# Patient Record
Sex: MALE | Race: WHITE | NOT HISPANIC OR LATINO | Employment: OTHER | URBAN - METROPOLITAN AREA
[De-identification: names, ages, dates, MRNs, and addresses within clinical notes are randomized per-mention and may not be internally consistent; named-entity substitution may affect disease eponyms.]

---

## 2017-02-22 ENCOUNTER — RADIANT APPOINTMENT (OUTPATIENT)
Dept: GENERAL RADIOLOGY | Facility: CLINIC | Age: 37
End: 2017-02-22
Attending: ORTHOPAEDIC SURGERY
Payer: COMMERCIAL

## 2017-02-22 ENCOUNTER — OFFICE VISIT (OUTPATIENT)
Dept: ORTHOPEDICS | Facility: CLINIC | Age: 37
End: 2017-02-22
Payer: COMMERCIAL

## 2017-02-22 VITALS
DIASTOLIC BLOOD PRESSURE: 72 MMHG | BODY MASS INDEX: 26.6 KG/M2 | WEIGHT: 190 LBS | RESPIRATION RATE: 16 BRPM | HEART RATE: 66 BPM | SYSTOLIC BLOOD PRESSURE: 139 MMHG | HEIGHT: 71 IN

## 2017-02-22 DIAGNOSIS — M25.621 STIFFNESS OF RIGHT ELBOW JOINT: ICD-10-CM

## 2017-02-22 DIAGNOSIS — S54.01XA: ICD-10-CM

## 2017-02-22 DIAGNOSIS — M25.621 STIFFNESS OF RIGHT ELBOW JOINT: Primary | ICD-10-CM

## 2017-02-22 PROCEDURE — 99203 OFFICE O/P NEW LOW 30 MIN: CPT | Performed by: ORTHOPAEDIC SURGERY

## 2017-02-22 PROCEDURE — 73070 X-RAY EXAM OF ELBOW: CPT | Mod: RT

## 2017-02-22 RX ORDER — TRAMADOL HYDROCHLORIDE 50 MG/1
50 TABLET ORAL EVERY 8 HOURS PRN
Qty: 30 TABLET | Refills: 0 | Status: SHIPPED | OUTPATIENT
Start: 2017-02-22 | End: 2018-10-24

## 2017-02-22 ASSESSMENT — PAIN SCALES - GENERAL: PAINLEVEL: MODERATE PAIN (4)

## 2017-02-22 NOTE — MR AVS SNAPSHOT
"              After Visit Summary   2/22/2017    John Valiente    MRN: [id number]           Patient Information     Date Of Birth          1980        Visit Information        Provider Department      2/22/2017 3:15 PM Marty Fernandes MD [facility]        Today's Diagnoses     Stiffness of right elbow joint    -  1    Contusion of ulnar nerve, right, initial encounter           Follow-ups after your visit        Your next 10 appointments already scheduled     Feb 27, 2017  8:00 AM CST   (Arrive by 7:45 AM)   Return Visit with Alexander Vaughan MD   [facility] ([facility])    [address] [place] [address]  4th Floor  [facility] [place] [address] [phone]              Who to contact     If you have questions or need follow up information about today's clinic visit or your schedule please contact [facility] directly at [phone].  Normal or non-critical lab and imaging results will be communicated to you by MyChart, letter or phone within 4 business days after the clinic has received the results. If you do not hear from us within 7 days, please contact the clinic through [facility] or phone. If you have a critical or abnormal lab result, we will notify you by phone as soon as possible.  Submit refill requests through [employer] or call your pharmacy and they will forward the refill request to us. Please allow 3 business days for your refill to be completed.          Additional Information About Your Visit        [facility] Information     [employer] lets you send messages to your doctor, view your test results, renew your prescriptions, schedule appointments and more. To sign up, go to [email or web address] . Click on \"Log in\" on the left side of the screen, which will take you to the Welcome page. Then click on \"Sign up Now\" on the right side of the page.     You will be asked to enter the access code listed below, as well as some personal " "information. Please follow the directions to create your username and password.     Your access code is: [email or web address]  Expires: 5/15/2017  6:31 AM     Your access code will  in 90 days. If you need help or a new code, please call your [facility] clinic or [phone].        Care EveryWhere ID     This is your Care EveryWhere ID. This could be used by other organizations to access your [facility] medical records  [id number]        Your Vitals Were     Pulse Respirations Height BMI (Body Mass Index)          66 16 1.803 m (5' 11\") 26.5 kg/m2         Blood Pressure from Last 3 Encounters:   17 139/72   10/02/15 121/63   09/29/15 125/72    Weight from Last 3 Encounters:   17 86.2 kg (190 lb)   10/02/15 82.6 kg (182 lb)   09/29/15 82.6 kg (182 lb 3.2 oz)                 Today's Medication Changes          These changes are accurate as of: 17 11:59 PM.  If you have any questions, ask your nurse or doctor.               Start taking these medicines.        Dose/Directions    traMADol 50 MG tablet   Commonly known as:  ULTRAM   Used for:  Contusion of ulnar nerve, right, initial encounter   Started by:  Marty Fernandes MD        Dose:  50 mg   Take 1 tablet (50 mg) by mouth every 8 hours as needed for pain   Quantity:  30 tablet   Refills:  0            Where to get your medicines      Some of these will need a paper prescription and others can be bought over the counter.  Ask your nurse if you have questions.     Bring a paper prescription for each of these medications     traMADol 50 MG tablet                Primary Care Provider    Physician No Ref-Primary       No address on file        Thank you!     Thank you for choosing [facility]  for your care. Our goal is always to provide you with excellent care. Hearing back from our patients is one way we can continue to improve our services. Please take a few minutes to complete the written survey that you may receive in the mail " after your visit with us. Thank you!             Your Updated Medication List - Protect others around you: Learn how to safely use, store and throw away your medicines at www.disposemymeds.org.          This list is accurate as of: 2/22/17 11:59 PM.  Always use your most recent med list.                   Brand Name Dispense Instructions for use    magnesium 100 MG Caps          oxyCODONE 5 MG IR tablet    ROXICODONE    15 tablet    Take 1-2 tablets (5-10 mg) by mouth every 3 hours as needed for pain or other (Moderate to Severe)       senna-docusate 8.6-50 MG per tablet    SENOKOT-S;PERICOLACE    20 tablet    Take 1 tablet by mouth 2 times daily as needed for constipation       traMADol 50 MG tablet    ULTRAM    30 tablet    Take 1 tablet (50 mg) by mouth every 8 hours as needed for pain       VITAMIN C PO

## 2017-02-22 NOTE — PROGRESS NOTES
SUBJECTIVE:  John Valiente is a 36 year old male who is seen as self referral for right elbow pain that started 2 weeks ago.   Onset: Following acute injury.  Mechanism of injury: slipped on the ice and landed on right elbow.     Initial symptoms: bleeding, mild pain    Present symptoms: Symptoms have been worsening 3 days after the injury.   Pain with any movements, such as washing the dishes. Pain location: point of impact on the olecranon. No numbness or radiating pain.     Previous treatment: Nothing    Prior history of related problems: no prior problems with this area in the past.    Patients past medical, surgical, social and family histories reviewed.     Occupation:      Past Medical History   Diagnosis Date     Alcohol abuse, in remission       Past Surgical History   Procedure Laterality Date     Tonsillectomy & adenoidectomy       C oral surgery procedure       Laparoscopic herniorrhaphy inguinal Right 10/2/2015     Procedure: LAPAROSCOPIC HERNIORRHAPHY INGUINAL;  Surgeon: Alexander Vaughan MD;  Location: [facility]      REVIEW OF SYSTEMS:   CONSTITUTIONAL:  NEGATIVE for fever, chills, change in weight  INTEGUMENTARY/SKIN:  NEGATIVE for worrisome rashes, moles or lesions  EYES:  NEGATIVE for vision changes or irritation  ENT/MOUTH:  NEGATIVE for ear, mouth and throat problems  RESP:  NEGATIVE for significant cough or SOB  BREAST:  NEGATIVE for masses, tenderness or discharge  CV:  NEGATIVE for chest pain, palpitations or peripheral edema  GI:  NEGATIVE for nausea, abdominal pain, heartburn, or change in bowel habits  :  Negative   MUSCULOSKELETAL:  See HPI above  NEURO:  NEGATIVE for weakness, dizziness or paresthesias  ENDOCRINE:  NEGATIVE for temperature intolerance, skin/hair changes  HEME/ALLERGY/IMMUNE:  NEGATIVE for bleeding problems  PSYCHIATRIC:  NEGATIVE for changes in mood or affect    EXAM:  /72 (BP Location: Left arm, Patient Position: Chair, Cuff Size: Adult  "Regular)  Pulse 66  Resp 16  Ht 1.803 m (5' 11\")  Wt 86.2 kg (190 lb)  BMI 26.5 kg/m2  GENERAL APPEARANCE: healthy, alert and no distress   GAIT: NORMAL  SKIN: no suspicious lesions or rashes  NEURO: normal strength and tone, sentation intact, reflexes normal, mentation intact, speech normal, DTR symmetrically normal in upper extremities and DTR symmetrically normal in lower extremities  PSYCH:  mentation appears normal and affect normal/bright    MUSCULOSKELETAL:  RIGHT ELBOW:    Inspection: Eschar where the open lesion is. No warmth or erythema. There is some bursal bodies palpable that are very tender. No evidence of infection.   Tender: impact area   Range of Motion: full extension painful   Sensation: normal   strength: good  Intrinsic muscles function well  Tinel's: positive with pain over the ulnar nerve     X-RAY INTEPRETATION: Obtained today of the RIGHT ELBOW: 2-views, reviewed in the office with the patient by myself today and show no fractures.     ASSESSMENT:  1. Elbow contusion  2. Ulnar nerve contusion    PLAN:  We discussed the diagnoses with the patient. I have informed the patient that this should resolve on its own. We talked about the signs to monitor for tardy ulnar nerve palsy. Consider wearing elbow pads.     Prescription: Tramadol ordered. He does not wish to take Tylenol due to his current liver problems.     Return to the clinic PRN.     JAIME Fernandes MD  Dept. Orthopedic Surgery  [facility]    This document serves as a record of the services and decisions personally performed and made by Dr. JAIME Fernandes MD. It was created on his behalf by Amaya Whatley, a trained medical scribe. The creation of this record is based on the provider's personal observations and the statements of the patient. This document has been checked and approved by the attending provider.   Amaya Whatley February 22, 2017 4:19 PM  "

## 2017-02-22 NOTE — NURSING NOTE
"Chief Complaint   Patient presents with     Elbow right     Patient is here for right elbow pain that started 1.5 week . Aching pain. Hurts when he bumo it on anything.        Initial /72 (BP Location: Left arm, Patient Position: Chair, Cuff Size: Adult Regular)  Pulse 66  Resp 16  Ht 1.803 m (5' 11\")  Wt 86.2 kg (190 lb)  BMI 26.5 kg/m2 Estimated body mass index is 26.5 kg/(m^2) as calculated from the following:    Height as of this encounter: 1.803 m (5' 11\").    Weight as of this encounter: 86.2 kg (190 lb).  Medication Reconciliation: complete   Mert Ramirez MA      "

## 2017-02-24 ENCOUNTER — TELEPHONE (OUTPATIENT)
Dept: SURGERY | Facility: CLINIC | Age: 37
End: 2017-02-24

## 2017-02-24 NOTE — TELEPHONE ENCOUNTER
Established Patient Telephone Reminder Call    Date of call:  02/24/17  Phone numbers:  Home number on file [phone] (home)    Reached patient/confirmed appointment:  Yes  Appointment with:   Dr. Alexander Vaughan  Reason for visit:  Rib cage hernia

## 2017-02-27 ENCOUNTER — OFFICE VISIT (OUTPATIENT)
Dept: SURGERY | Facility: CLINIC | Age: 37
End: 2017-02-27

## 2017-02-27 VITALS
HEIGHT: 71 IN | BODY MASS INDEX: 26.84 KG/M2 | OXYGEN SATURATION: 98 % | SYSTOLIC BLOOD PRESSURE: 120 MMHG | DIASTOLIC BLOOD PRESSURE: 80 MMHG | WEIGHT: 191.7 LBS | TEMPERATURE: 98 F

## 2017-02-27 DIAGNOSIS — R52 PAIN: Primary | ICD-10-CM

## 2017-02-27 ASSESSMENT — PAIN SCALES - GENERAL: PAINLEVEL: MILD PAIN (2)

## 2017-02-27 NOTE — LETTER
Date:February 28, 2017      Provider requested that no letter be sent. Do not send.       [facility] Information

## 2017-02-27 NOTE — LETTER
2/27/2017       RE: John Valiente  [address] [address]     Dear Colleague,    Thank you for referring your patient, John Valiente, to the [facility] GENERAL SURGERY at [facility]. Please see a copy of my visit note below.    John Valiente is a 36 year old male with a 2 month history of a left intracostal mass associated with the following symptoms of lump and pain.  No respiratory complaints.  Finding was not work related.  Onset did occur with lifting.  Obstructive symptoms:  no  Urinary difficulties:  no  Chronic cough: no  Constipation:  no  Current level of activity:  Medium, works construction    Past medical history, medications, allergies, family history, and social history were reviewed with the patient. Inguinal hernia done by me in past.    ROS: 10 point review of systems negative except noted in HPI  PHYSICAL EXAM  General appearance- healthy, alert, and in no distress.  Skin- Skin color, texture, and turgor normal.  No rashes.  Neck- Neck is supple with no obvious adenopathy.  Lungs- Respiratory effort unlabored.  Gait- Normal.  Chest wall stable without masses or bulges.  Point patient states mass comes is not observed today, but is at the left intracostal space.    Impression: complaints of chest wall hernia subjective without objective findings today.  Recommend: CT chest and abdomen to rule out occult hernia.  Study ordered I will call results.  The total time spent with this patient was 25 minutes.  Of this time, greater than 50% was spent counseling and coordinating care.          Again, thank you for allowing me to participate in the care of your patient.      Sincerely,    Alexander Vaughan MD

## 2017-02-27 NOTE — NURSING NOTE
"Chief Complaint   Patient presents with     RECHECK     f/u       Vitals:    02/27/17 0811   BP: 120/80   BP Location: Left arm   Patient Position: Chair   Temp: 98  F (36.7  C)   SpO2: 98%   Weight: 191 lb 11.2 oz   Height: 5' 11\"       Body mass index is 26.74 kg/(m^2).    Alfa Garnica MA                          "

## 2017-02-27 NOTE — PROGRESS NOTES
John Valiente is a 36 year old male with a 2 month history of a left intracostal mass associated with the following symptoms of lump and pain.  No respiratory complaints.  Finding was not work related.  Onset did occur with lifting.  Obstructive symptoms:  no  Urinary difficulties:  no  Chronic cough: no  Constipation:  no  Current level of activity:  Medium, works construction    Past medical history, medications, allergies, family history, and social history were reviewed with the patient. Inguinal hernia done by me in past.    ROS: 10 point review of systems negative except noted in HPI  PHYSICAL EXAM  General appearance- healthy, alert, and in no distress.  Skin- Skin color, texture, and turgor normal.  No rashes.  Neck- Neck is supple with no obvious adenopathy.  Lungs- Respiratory effort unlabored.  Gait- Normal.  Chest wall stable without masses or bulges.  Point patient states mass comes is not observed today, but is at the left intracostal space.    Impression: complaints of chest wall hernia subjective without objective findings today.  Recommend: CT chest and abdomen to rule out occult hernia.  Study ordered I will call results.  The total time spent with this patient was 25 minutes.  Of this time, greater than 50% was spent counseling and coordinating care.

## 2018-10-24 ENCOUNTER — OFFICE VISIT (OUTPATIENT)
Dept: FAMILY MEDICINE | Facility: CLINIC | Age: 38
End: 2018-10-24
Payer: COMMERCIAL

## 2018-10-24 ENCOUNTER — OFFICE VISIT (OUTPATIENT)
Dept: OTOLARYNGOLOGY | Facility: CLINIC | Age: 38
End: 2018-10-24
Payer: COMMERCIAL

## 2018-10-24 VITALS
TEMPERATURE: 98.4 F | HEART RATE: 66 BPM | WEIGHT: 190 LBS | BODY MASS INDEX: 25.73 KG/M2 | DIASTOLIC BLOOD PRESSURE: 66 MMHG | HEIGHT: 72 IN | OXYGEN SATURATION: 97 % | SYSTOLIC BLOOD PRESSURE: 120 MMHG

## 2018-10-24 VITALS
HEIGHT: 72 IN | BODY MASS INDEX: 25.73 KG/M2 | HEART RATE: 63 BPM | SYSTOLIC BLOOD PRESSURE: 117 MMHG | OXYGEN SATURATION: 97 % | RESPIRATION RATE: 12 BRPM | DIASTOLIC BLOOD PRESSURE: 73 MMHG | WEIGHT: 190 LBS

## 2018-10-24 DIAGNOSIS — F10.11 ALCOHOL ABUSE, IN REMISSION: ICD-10-CM

## 2018-10-24 DIAGNOSIS — Z13.6 CARDIOVASCULAR SCREENING; LDL GOAL LESS THAN 160: ICD-10-CM

## 2018-10-24 DIAGNOSIS — L98.9 CHANGING SKIN LESION: ICD-10-CM

## 2018-10-24 DIAGNOSIS — Z11.3 SCREEN FOR STD (SEXUALLY TRANSMITTED DISEASE): ICD-10-CM

## 2018-10-24 DIAGNOSIS — Z11.4 SCREENING FOR HIV (HUMAN IMMUNODEFICIENCY VIRUS): ICD-10-CM

## 2018-10-24 DIAGNOSIS — R06.83 SNORING: ICD-10-CM

## 2018-10-24 DIAGNOSIS — Z13.21 ENCOUNTER FOR VITAMIN DEFICIENCY SCREENING: ICD-10-CM

## 2018-10-24 DIAGNOSIS — L72.3 SEBACEOUS CYST: ICD-10-CM

## 2018-10-24 DIAGNOSIS — J02.9 SORE THROAT: Primary | ICD-10-CM

## 2018-10-24 DIAGNOSIS — B35.6 TINEA OF GROIN: ICD-10-CM

## 2018-10-24 DIAGNOSIS — Z00.00 ROUTINE HISTORY AND PHYSICAL EXAMINATION OF ADULT: Primary | ICD-10-CM

## 2018-10-24 PROCEDURE — 99204 OFFICE O/P NEW MOD 45 MIN: CPT | Mod: 25 | Performed by: OTOLARYNGOLOGY

## 2018-10-24 PROCEDURE — 99385 PREV VISIT NEW AGE 18-39: CPT | Performed by: NURSE PRACTITIONER

## 2018-10-24 PROCEDURE — 31575 DIAGNOSTIC LARYNGOSCOPY: CPT | Performed by: OTOLARYNGOLOGY

## 2018-10-24 RX ORDER — SACCHAROMYCES BOULARDII 250 MG
250 CAPSULE ORAL 2 TIMES DAILY
COMMUNITY

## 2018-10-24 ASSESSMENT — PAIN SCALES - GENERAL: PAINLEVEL: NO PAIN (0)

## 2018-10-24 NOTE — MR AVS SNAPSHOT
After Visit Summary   10/24/2018    John Valiente    MRN: [id number]           Patient Information     Date Of Birth          1980        Visit Information        Provider Department      10/24/2018 2:20 PM Gin Mari APRN [facility]        Today's Diagnoses     Routine history and physical examination of adult    -  1    Sebaceous cyst        Tinea of groin        Alcohol abuse, in remission        Screening for HIV (human immunodeficiency virus)        Screen for STD (sexually transmitted disease)        Encounter for vitamin deficiency screening        CARDIOVASCULAR SCREENING; LDL GOAL LESS THAN 160        Changing skin lesion          Care Instructions      Preventive Health Recommendations  Male Ages 26 - 39    Yearly exam:             See your health care provider every year in order to  o   Review health changes.   o   Discuss preventive care.    o   Review your medicines if your doctor has prescribed any.    You should be tested each year for STDs (sexually transmitted diseases), if you re at risk.     After age 35, talk to your provider about cholesterol testing. If you are at risk for heart disease, have your cholesterol tested at least every 5 years.     If you are at risk for diabetes, you should have a diabetes test (fasting glucose).  Shots: Get a flu shot each year. Get a tetanus shot every 10 years.     Nutrition:    Eat at least 5 servings of fruits and vegetables daily.     Eat whole-grain bread, whole-wheat pasta and brown rice instead of white grains and rice.     Get adequate Calcium and Vitamin D.     Lifestyle    Exercise for at least 150 minutes a week (30 minutes a day, 5 days a week). This will help you control your weight and prevent disease.     Limit alcohol to one drink per day.     No smoking.     Wear sunscreen to prevent skin cancer.     See your dentist every six months for an exam and cleaning.     [facility]    If  you have any questions regarding to your visit please contact your care team:       Team Red:   Clinic Hours Telephone Number   Dr. Torrie Mari NP 7am-7pm  Monday - Thursday   7am-5pm  Fridays  [phone]  (Appointment scheduling available 24/7)   Urgent Care - [place] and [facility] - 11am-9pm Monday-Friday Saturday-Sunday- 9am-5pm   [place] - 5pm-9pm Monday-Friday Saturday-Sunday- 9am-5pm  [phone] - [place]  [phone] - [place]       What options do I have for a visit other than an office visit? We offer electronic visits (e-visits) and telephone visits, when medically appropriate.  Please check with your medical insurance to see if these types of visits are covered, as you will be responsible for any charges that are not paid by your insurance.      You can use [employer] (secure electronic communication) to access to your chart, send your primary care provider a message, or make an appointment. Ask a team member how to get started.     For a price quote for your services, please call our Consumer Price Line at [phone] or our Imaging Cost estimation line at [phone] (for imaging tests).    Discharged by Peyton Stark MA.            Follow-ups after your visit        Follow-up notes from your care team     Return in about 1 month (around 11/24/2018) for shave biopsy, fasting labs.      Your next 10 appointments already scheduled     Oct 24, 2018  2:20 PM CDT   PHYSICAL with MICKEY Arnold CNP   [facility] ([facility])    [address] [id number] [phone]              Future tests that were ordered for you today     Open Future Orders        Priority Expected Expires Ordered    Hemoglobin A1c Routine 11/24/2018 10/24/2019 10/24/2018    Vitamin B12 Routine 11/24/2018 10/24/2019 10/24/2018    Folate Routine 11/24/2018 10/24/2019 10/24/2018    Vitamin D  "Deficiency Routine 11/24/2018 10/24/2019 10/24/2018    Neisseria gonorrhoeae PCR Routine 11/24/2018 10/24/2019 10/24/2018    Chlamydia trachomatis PCR Routine 11/24/2018 10/24/2019 10/24/2018    Hepatitis B surface antigen Routine 11/24/2018 10/24/2019 10/24/2018    Hepatitis C antibody Routine 11/24/2018 10/24/2019 10/24/2018    HIV Screening Routine 11/24/2018 10/24/2019 10/24/2018    Hepatic panel Routine 11/24/2018 10/24/2019 10/24/2018    Lipid panel reflex to direct LDL Fasting Routine 11/24/2018 10/24/2019 10/24/2018    SLEEP EVALUATION & MANAGEMENT REFERRAL - ADULT -[facility] [place] / [facility]  [phone] (Age 2 and up) Routine  10/24/2019 10/24/2018            Who to contact     If you have questions or need follow up information about today's clinic visit or your schedule please contact [facility] directly at [phone].  Normal or non-critical lab and imaging results will be communicated to you by MyChart, letter or phone within 4 business days after the clinic has received the results. If you do not hear from us within 7 days, please contact the clinic through MyChart or phone. If you have a critical or abnormal lab result, we will notify you by phone as soon as possible.  Submit refill requests through [employer] or call your pharmacy and they will forward the refill request to us. Please allow 3 business days for your refill to be completed.          Additional Information About Your Visit        Care EveryWhere ID     This is your Care EveryWhere ID. This could be used by other organizations to access your [facility] medical records  [id number]        Your Vitals Were     Pulse Temperature Height Pulse Oximetry BMI (Body Mass Index)       66 98.4  F (36.9  C) (Oral) 5' 11.75\" (1.822 m) 97% 25.95 kg/m2        Blood Pressure from Last 3 Encounters:   10/24/18 120/66   10/24/18 117/73   10/24/18 120/66    Weight from Last 3 Encounters:   10/24/18 190 lb " (86.2 kg)   10/24/18 190 lb (86.2 kg)   10/24/18 190 lb (86.2 kg)               Primary Care Provider Fax #    Physician No Ref-Primary [phone]       No address on file        Equal Access to Services     GI LACIE : Hadii radhames robertson bono Soomaali, waaxda luqadaha, qaybta kaalmada adefrank, jose salazar lajensaurabh proctor. So [facility] [phone].    ATENCIÓN: Si habla español, tiene a sanchez disposición servicios gratuitos de asistencia lingüística. Llame al [phone].    We comply with applicable federal civil rights laws and [place] laws. We do not discriminate on the basis of race, color, national origin, age, disability, sex, sexual orientation, or gender identity.            Thank you!     Thank you [facility]  for your care. Our goal is always to provide you with excellent care. Hearing back from our patients is one way we can continue to improve our services. Please take a few minutes to complete the written survey that you may receive in the mail after your visit with us. Thank you!             Your Updated Medication List - Protect others around you: Learn how to safely use, store and throw away your medicines at www.disposemymeds.org.          This list is accurate as of 10/24/18  1:13 PM.  Always use your most recent med list.                   Brand Name Dispense Instructions for use Diagnosis    KELP PO           magnesium 100 MG Caps           MAGNESIUM OXIDE PO           saccharomyces boulardii 250 MG capsule    FLORASTOR     Take 250 mg by mouth 2 times daily        SUPER GRAPEFRUIT N FIBER DIET PO           VITAMIN C PO

## 2018-10-24 NOTE — LETTER
10/24/2018         RE: John Valiente  [address] [facility] [address]        Dear Colleague,    Thank you for referring your patient, John Valiente, to the [facility]. Please see a copy of my visit note below.    Chief Complaint - sore throat    History of Present Illness - John Valiente is a 38 year old male who presents with a history of sore throat. This has been going on for 6 months. They describe the sore throat as located on the right side. It can be intermittent. Feels like an irritation, dry, some swelling, points to right upper neck. Feels a small bump, pressure helps. When he swallows he feels it. Voicing was changed some when it was the most painful. No hemoptysis. They note no history of relux. Nonsmoker for cigarettes, but has been smoking marijauna. Tried stopping coffee 2 months ago, and it helped for 2 weeks. Also did a 2 week fast, and that helped. 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 Has right ear hearing loss and tinnitus. He thinks that is from noise exposure. Has some right jaw crunching nose.     Past Medical History -   Patient Active Problem List   Diagnosis     Inguinal hernia     Back pain       Current Medications -   Current Outpatient Prescriptions:      Ascorbic Acid (VITAMIN C PO), , Disp: , Rfl:      magnesium 100 MG CAPS, , Disp: , Rfl:      oxyCODONE (ROXICODONE) 5 MG immediate release tablet, Take 1-2 tablets (5-10 mg) by mouth every 3 hours as needed for pain or other (Moderate to Severe) (Patient not taking: Reported on 10/24/2018), Disp: 15 tablet, Rfl: 0     senna-docusate (SENOKOT-S;PERICOLACE) 8.6-50 MG per tablet, Take 1 tablet by mouth 2 times daily as needed for constipation (Patient not taking: Reported on 10/24/2018), Disp: 20 tablet, Rfl: 0     traMADol (ULTRAM) 50 MG tablet, Take 1 tablet (50 mg) by mouth every 8 hours as needed for pain (Patient not taking: Reported on 10/24/2018), Disp: 30 tablet, Rfl:  "0    Allergies - No Known Allergies    Social History -   Social History     Social History     Marital status:      Spouse name: N/A     Number of children: N/A     Years of education: N/A     Social History Main Topics     Smoking status: Never Smoker     Smokeless tobacco: Never Used     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    Review of Systems - As per HPI and PMHx, otherwise 10+ comprehensive system review is negative.    Physical Exam  /73  Pulse 63  Resp 12  Ht 1.822 m (5' 11.75\")  Wt 86.2 kg (190 lb)  SpO2 97%  BMI 25.95 kg/m2  General - The patient is in no distress. Alert and oriented to person and place, answers questions and cooperates with examination appropriately.   Voice and Breathing - The patient was breathing comfortably without the use of accessory muscles. There was no wheezing, stridor, or stertor.  The patients voice was clear and strong.  Eyes - Extraocular movements intact.  Sclera were not icteric or injected, conjunctiva were pink and moist.  Mouth - Examination of the oral cavity showed pink, healthy oral mucosa. No lesions or ulcerations noted.  The tongue was mobile and midline.  Throat - The walls of the oropharynx were smooth, symmetric, and had no lesions or ulcerations.  The tonsillar pillars and soft palate were symmetric.  The uvula was midline on elevation. Tonsils absent. No postnasal drainage.  Nose - External contour is symmetric, no gross deflection or scars.  Nasal mucosa is pink and moist with no abnormal mucus.  The septum was midline and non-obstructive, turbinates congested.  No polyps, masses, or purulence noted on examination.  Neck -  Some tenderness level 2. Carotid artery was likely the lump he was feeling. Palpation of the occipital, submental, " submandibular, internal jugular chain, and supraclavicular nodes did not demonstrate any abnormal lymph nodes or masses. No parotid masses. Palpation of the thyroid was soft and smooth, with no nodules or goiter appreciated.  The trachea was mobile and midline.  Neurologic - CN II-XII are grossly intact, no focal neurologic deficits.   Cardiovascular - carotid pulses are 2+ bilaterally, regular rhythm    Procedure: Flexible Endoscopy  Indication: Sore Throat    To best visualize the upper airway anatomy and due to the chief complaint and HPI, I proceeded with a fiberoptic examination. Color photographs were taken for the medical record. First I sprayed the right side of the nose with a mixture of lidocaine and neosynephrine.  I then passed the scope through the nasal cavity.  The nasal cavity had some congestion.  The nasopharynx was mucosally covered and symmetric.  The Eustachian tube openings were unobstructed.  Going further down I had a clear view of the base of tongue which had normal appearing lingual tonsillar tissue.  The base of tongue was free of lesions, masses, and the vallecula was open.  The epiglottis was smooth and mucosally covered.  The supraglottic larynx was then clearly visualized. It was normal. No masses or erythema. No lesions. The vocal cords moved smoothly and symmetrically, they were pearly white and no lesions were seen.  The pyriform sinuses were open, and the limited view of the postcricoid region did not show any lesions.      A/P - John Valiente is a 38 year old male with a sore throat. Exam revealed no obvious pathology. I think the most likely etiology is multifactorial.  It is likely due to dryness from excessive caffeine intake, smoking, mouth breathing from snoring and possible sleep apnea, and the lack of hydration.. I recommend a sleep medicine consult and likely sleep study to rule out sleep apnea and mouth breathing at night.  I also recommend less caffeine, more  hydration, no smoking, and salt water gargles.  If this persists or worsens he should return to me.        Dr. Lv Norris  Otolaryngology  [facility]      Again, thank you for allowing me to participate in the care of your patient.        Sincerely,        Lv Norris MD

## 2018-10-24 NOTE — MR AVS SNAPSHOT
After Visit Summary   10/24/2018    John Valiente    MRN: [id number]           Patient Information     Date Of Birth          1980        Visit Information        Provider Department      10/24/2018 11:15 AM Lv Norris MD [facility]        Today's Diagnoses     Snoring    -  1      Care Instructions    General Scheduling Information  To schedule your CT/MRI scan, please contact [place] [facility] at [phone] [address] [place] [address]    To schedule your Surgery, please contact our Specialty Schedulers at [phone]    ENT Clinic Locations Clinic Hours Telephone Number     [facility]  [address]  [place] [address]   Tuesday:       8:00am -- 4:00pm    Wednesday:  8:00am - 4:00pm   To schedule an appointment with   Dr. Norris,   please contact our   Specialty Scheduling Department at:     [phone]       [facility]  [address] [place] [address]   Friday:          8:00am - 4:00pm         Urgent Care Locations Clinic Hours Telephone Numbers     [facility]  [address]  [place] [address]     Monday-Friday:     11:00pm - 9:00pm    Saturday-Sunday:  9:00am - 5:00pm   [phone]     [facility]  [address] [place] [address]     Monday-Friday:      5:00pm - 9:00pm     Saturday-Sunday:  9:00am - 5:00pm   [phone]               Follow-ups after your visit        Additional Services     SLEEP EVALUATION & MANAGEMENT REFERRAL - ADULT -[facility]  [phone] (Age 2 and up)       Please be aware that coverage of these services is subject to the terms and limitations of your health insurance plan.  Call member services at your health plan with any benefit or coverage questions.      Please bring the following to your appointment:    >>   List of current medications   >>   This referral request   >>   Any documents/labs given to you  "for this referral                      Your next 10 appointments already scheduled     Oct 24, 2018  2:20 PM CDT   PHYSICAL with MICKEY Arnold CNP   [facility] ([facility])    [address]  [place] [id number] [phone]              Future tests that were ordered for you today     Open Future Orders        Priority Expected Expires Ordered    SLEEP EVALUATION & MANAGEMENT REFERRAL - ADULT -[facility]  [phone] (Age 2 and up) Routine  10/24/2019 10/24/2018            Who to contact     If you have questions or need follow up information about today's clinic visit or your schedule please contact [facility] directly at [phone].  Normal or non-critical lab and imaging results will be communicated to you by MyChart, letter or phone within 4 business days after the clinic has received the results. If you do not hear from us within 7 days, please contact the clinic through MyChart or phone. If you have a critical or abnormal lab result, we will notify you by phone as soon as possible.  Submit refill requests through [employer] or call your pharmacy and they will forward the refill request to us. Please allow 3 business days for your refill to be completed.          Additional Information About Your Visit        Care EveryWhere ID     This is your Care EveryWhere ID. This could be used by other organizations to access your [facility] medical records  [id number]        Your Vitals Were     Pulse Respirations Height Pulse Oximetry BMI (Body Mass Index)       63 12 1.822 m (5' 11.75\") 97% 25.95 kg/m2        Blood Pressure from Last 3 Encounters:   10/24/18 117/73   10/24/18 120/66   02/27/17 120/80    Weight from Last 3 Encounters:   10/24/18 86.2 kg (190 lb)   10/24/18 86.2 kg (190 lb)   02/27/17 87 kg (191 lb 11.2 oz)               Primary Care Provider Fax #    Physician No Ref-Primary [phone] "       No address on file        Equal Access to Services     [facility] LACIE : Hadii aad ailyn alex Ray, waaxda luqadaha, qaybta kaaljohn dulce mariaaidankenya, waxalexia nicole huntcristianharoon proctor. So [facility] [place] [phone].    ATENCIÓN: Si habla español, tiene a sanchez disposición servicios gratuitos de asistencia lingüística. Nancyame al [phone].    We comply with applicable federal civil rights laws and [place] laws. We do not discriminate on the basis of race, color, national origin, age, disability, sex, sexual orientation, or gender identity.            Thank you!     Thank you for choosing [facility]  for your care. Our goal is always to provide you with excellent care. Hearing back from our patients is one way we can continue to improve our services. Please take a few minutes to complete the written survey that you may receive in the mail after your visit with us. Thank you!             Your Updated Medication List - Protect others around you: Learn how to safely use, store and throw away your medicines at www.disposemymeds.org.          This list is accurate as of 10/24/18 12:02 PM.  Always use your most recent med list.                   Brand Name Dispense Instructions for use Diagnosis    magnesium 100 MG Caps           oxyCODONE IR 5 MG tablet    ROXICODONE    15 tablet    Take 1-2 tablets (5-10 mg) by mouth every 3 hours as needed for pain or other (Moderate to Severe)    Unilateral inguinal hernia without obstruction or gangrene, recurrence not specified       senna-docusate 8.6-50 MG per tablet    SENOKOT-S;PERICOLACE    20 tablet    Take 1 tablet by mouth 2 times daily as needed for constipation    Unilateral inguinal hernia without obstruction or gangrene, recurrence not specified       traMADol 50 MG tablet    ULTRAM    30 tablet    Take 1 tablet (50 mg) by mouth every 8 hours as needed for pain    Contusion of ulnar nerve, right, initial encounter       VITAMIN C PO

## 2018-10-24 NOTE — PATIENT INSTRUCTIONS
General Scheduling Information  To schedule your CT/MRI scan, please contact [place] [facility] at [phone]   [address] [place] [address]    To schedule your Surgery, please contact our Specialty Schedulers at [phone]    ENT Clinic Locations Clinic Hours Telephone Number     [facility] [place]  [address] [place] [address]   Tuesday:       8:00am -- 4:00pm    Wednesday:  8:00am - 4:00pm   To schedule an appointment with   Dr. Norris,   please contact our   Specialty Scheduling Department at:     [phone]       [facility]  [address] [place] [address]   Friday:          8:00am - 4:00pm         Urgent Care Locations Clinic Hours Telephone Numbers     [facility]  [address] [place] [address]     Monday-Friday:     11:00pm - 9:00pm    Saturday-Sunday:  9:00am - 5:00pm   [phone]     [facility]  [address] [place] [address]     Monday-Friday:      5:00pm - 9:00pm     Saturday-Sunday:  9:00am - 5:00pm   [phone]

## 2018-10-24 NOTE — PROGRESS NOTES
Chief Complaint - sore throat    History of Present Illness - John Valiente is a 38 year old male who presents with a history of sore throat. This has been going on for 6 months. They describe the sore throat as located on the right side. It can be intermittent. Feels like an irritation, dry, some swelling, points to right upper neck. Feels a small bump, pressure helps. When he swallows he feels it. Voicing was changed some when it was the most painful. No hemoptysis. They note no history of relux. Nonsmoker for cigarettes, but has been smoking marijauna. Tried stopping coffee 2 months ago, and it helped for 2 weeks. Also did a 2 week fast, and that helped. 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 Has right ear hearing loss and tinnitus. He thinks that is from noise exposure. Has some right jaw crunching nose.     Past Medical History -   Patient Active Problem List   Diagnosis     Inguinal hernia     Back pain       Current Medications -   Current Outpatient Prescriptions:      Ascorbic Acid (VITAMIN C PO), , Disp: , Rfl:      magnesium 100 MG CAPS, , Disp: , Rfl:      oxyCODONE (ROXICODONE) 5 MG immediate release tablet, Take 1-2 tablets (5-10 mg) by mouth every 3 hours as needed for pain or other (Moderate to Severe) (Patient not taking: Reported on 10/24/2018), Disp: 15 tablet, Rfl: 0     senna-docusate (SENOKOT-S;PERICOLACE) 8.6-50 MG per tablet, Take 1 tablet by mouth 2 times daily as needed for constipation (Patient not taking: Reported on 10/24/2018), Disp: 20 tablet, Rfl: 0     traMADol (ULTRAM) 50 MG tablet, Take 1 tablet (50 mg) by mouth every 8 hours as needed for pain (Patient not taking: Reported on 10/24/2018), Disp: 30 tablet, Rfl: 0    Allergies - No Known Allergies    Social History -   Social History     Social History     Marital status:      Spouse name: N/A     Number of children: N/A     Years of education: N/A     Social History Main Topics     Smoking status: Never  "Smoker     Smokeless tobacco: Never Used     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    Review of Systems - As per HPI and PMHx, otherwise 10+ comprehensive system review is negative.    Physical Exam  /73  Pulse 63  Resp 12  Ht 1.822 m (5' 11.75\")  Wt 86.2 kg (190 lb)  SpO2 97%  BMI 25.95 kg/m2  General - The patient is in no distress. Alert and oriented to person and place, answers questions and cooperates with examination appropriately.   Voice and Breathing - The patient was breathing comfortably without the use of accessory muscles. There was no wheezing, stridor, or stertor.  The patients voice was clear and strong.  Eyes - Extraocular movements intact.  Sclera were not icteric or injected, conjunctiva were pink and moist.  Mouth - Examination of the oral cavity showed pink, healthy oral mucosa. No lesions or ulcerations noted.  The tongue was mobile and midline.  Throat - The walls of the oropharynx were smooth, symmetric, and had no lesions or ulcerations.  The tonsillar pillars and soft palate were symmetric.  The uvula was midline on elevation. Tonsils absent. No postnasal drainage.  Nose - External contour is symmetric, no gross deflection or scars.  Nasal mucosa is pink and moist with no abnormal mucus.  The septum was midline and non-obstructive, turbinates congested.  No polyps, masses, or purulence noted on examination.  Neck -  Some tenderness level 2. Carotid artery was likely the lump he was feeling. Palpation of the occipital, submental, submandibular, internal jugular chain, and supraclavicular nodes did not demonstrate any abnormal lymph nodes or masses. No parotid masses. Palpation of the thyroid was soft and smooth, with no nodules or goiter appreciated.  The trachea was mobile and " midline.  Neurologic - CN II-XII are grossly intact, no focal neurologic deficits.   Cardiovascular - carotid pulses are 2+ bilaterally, regular rhythm    Procedure: Flexible Endoscopy  Indication: Sore Throat    To best visualize the upper airway anatomy and due to the chief complaint and HPI, I proceeded with a fiberoptic examination. Color photographs were taken for the medical record. First I sprayed the right side of the nose with a mixture of lidocaine and neosynephrine.  I then passed the scope through the nasal cavity.  The nasal cavity had some congestion.  The nasopharynx was mucosally covered and symmetric.  The Eustachian tube openings were unobstructed.  Going further down I had a clear view of the base of tongue which had normal appearing lingual tonsillar tissue.  The base of tongue was free of lesions, masses, and the vallecula was open.  The epiglottis was smooth and mucosally covered.  The supraglottic larynx was then clearly visualized. It was normal. No masses or erythema. No lesions. The vocal cords moved smoothly and symmetrically, they were pearly white and no lesions were seen.  The pyriform sinuses were open, and the limited view of the postcricoid region did not show any lesions.      A/P - John Valiente is a 38 year old male with a sore throat. Exam revealed no obvious pathology. I think the most likely etiology is multifactorial.  It is likely due to dryness from excessive caffeine intake, smoking, mouth breathing from snoring and possible sleep apnea, and the lack of hydration.. I recommend a sleep medicine consult and likely sleep study to rule out sleep apnea and mouth breathing at night.  I also recommend less caffeine, more hydration, no smoking, and salt water gargles.  If this persists or worsens he should return to me.        Dr. Lv Norris  Otolaryngology  [facility]

## 2018-10-24 NOTE — PATIENT INSTRUCTIONS
Preventive Health Recommendations  Male Ages 26 - 39    Yearly exam:             See your health care provider every year in order to  o   Review health changes.   o   Discuss preventive care.    o   Review your medicines if your doctor has prescribed any.    You should be tested each year for STDs (sexually transmitted diseases), if you re at risk.     After age 35, talk to your provider about cholesterol testing. If you are at risk for heart disease, have your cholesterol tested at least every 5 years.     If you are at risk for diabetes, you should have a diabetes test (fasting glucose).  Shots: Get a flu shot each year. Get a tetanus shot every 10 years.     Nutrition:    Eat at least 5 servings of fruits and vegetables daily.     Eat whole-grain bread, whole-wheat pasta and brown rice instead of white grains and rice.     Get adequate Calcium and Vitamin D.     Lifestyle    Exercise for at least 150 minutes a week (30 minutes a day, 5 days a week). This will help you control your weight and prevent disease.     Limit alcohol to one drink per day.     No smoking.     Wear sunscreen to prevent skin cancer.     See your dentist every six months for an exam and cleaning.     [facility]    If you have any questions regarding to your visit please contact your care team:       Team Red:   Clinic Hours Telephone Number   Dr. Torrie Mari, NP 7am-7pm  Monday - Thursday   7am-5pm  Fridays  [phone]  (Appointment scheduling available 24/7)   Urgent Care - [facility] - 11am-9pm Monday-Friday Saturday-Sunday- 9am-5pm   [place] - 5pm-9pm Monday-Friday Saturday-Sunday- 9am-5pm  [phone] - [place]  [phone] - [place]       What options do I have for a visit other than an office visit? We offer electronic visits (e-visits) and telephone visits, when medically appropriate.  Please check with your medical insurance  to see if these types of visits are covered, as you will be responsible for any charges that are not paid by your insurance.      You can use Dragon Ports (secure electronic communication) to access to your chart, send your primary care provider a message, or make an appointment. Ask a team member how to get started.     For a price quote for your services, please call our Consumer Price Line at [phone] or our Imaging Cost estimation line at [phone] (for imaging tests).    Discharged by Peyton Stark MA.

## 2018-10-24 NOTE — PROGRESS NOTES
"  SUBJECTIVE:   CC: John Valiente is an 38 year old male who presents for preventative health visit.     Healthy Habits:    Do you get at least three servings of calcium containing foods daily (dairy, green leafy vegetables, etc.)? yes    Amount of exercise or daily activities, outside of work: gets a lot of exercise through work    Problems taking medications regularly not applicable    Medication side effects: No    Have you had an eye exam in the past two years? yes    Do you see a dentist twice per year? yes    Do you have sleep apnea, excessive snoring or daytime drowsiness?yes    Patient reports hearing, right jaw pain, sore throat- saw ENT today and will having hearing test. Scheduling sleep study- snores and wife thinks he has apnea.    -Small nontender Lump in belly/ribs 6 months  -Rash in groin over 1 year- itchy. Wife thinks it's jock itch. No over the counter meds tried  -Pulled off skin tag in left armpit 2 weeks ago, had been there a few months ago. Wondered if it was a tic but didn't see one.  -History alcohol abuse, sober x8 years. Wonders about having LFTs checked  -hernia surgery 3-4 years ago. Wonders if something was damaged? Right testicle higher now. No ED, no dysuria, hematuria, testicular pain/mass.  -Wife is a nutritional therapist and wants multiple labs done including \"vitamin panel\"      Today's PHQ-2 Score:   PHQ-2 ( 1999 Pfizer) 10/24/2018 10/24/2018   Q1: Little interest or pleasure in doing things 0 0   Q2: Feeling down, depressed or hopeless 0 0   PHQ-2 Score 0 0       Abuse: Current or Past(Physical, Sexual or Emotional)- No  Do you feel safe in your environment - Yes    Social History   Substance Use Topics     Smoking status: Never Smoker     Smokeless tobacco: Never Used     Alcohol use No      Comment: sober x5-6 years      If you drink alcohol do you typically have >3 drinks per day or >7 drinks per week? No                      Last PSA: No results found for: " "PSA    Reviewed orders with patient. Reviewed health maintenance and updated orders accordingly - Yes  Labs reviewed in EPIC    Reviewed and updated as needed this visit by clinical staff         Reviewed and updated as needed this visit by Provider            ROS:  CONSTITUTIONAL: NEGATIVE for fever, chills, change in weight  INTEGUMENTARY/SKIN: as above  EYES: NEGATIVE for vision changes or irritation  ENT: NEGATIVE for ear, mouth and throat problems  RESP: NEGATIVE for significant cough or SOB  CV: NEGATIVE for chest pain, palpitations or peripheral edema  GI: NEGATIVE for nausea, abdominal pain, heartburn, or change in bowel habits   male: as above  MUSCULOSKELETAL: NEGATIVE for significant arthralgias or myalgia  NEURO: NEGATIVE for weakness, dizziness or paresthesias  PSYCHIATRIC: NEGATIVE for changes in mood or affect    OBJECTIVE:   /66 (BP Location: Left arm, Patient Position: Chair, Cuff Size: Adult Regular)  Pulse 66  Temp 98.4  F (36.9  C) (Oral)  Ht 5' 11.75\" (1.822 m)  Wt 190 lb (86.2 kg)  SpO2 97%  BMI 25.95 kg/m2  EXAM:  GENERAL: healthy, alert and no distress  EYES: Eyes grossly normal to inspection, PERRL and conjunctivae and sclerae normal  HENT: ear canals and TM's normal, nose and mouth without ulcers or lesions  NECK: no adenopathy, no asymmetry, masses, or scars and thyroid normal to palpation  RESP: lungs clear to auscultation - no rales, rhonchi or wheezes  CV: regular rate and rhythm, normal S1 S2, no S3 or S4, no murmur, click or rub, no peripheral edema and peripheral pulses strong  ABDOMEN: soft, nontender, no hepatosplenomegaly, no masses and bowel sounds normal  MS: no gross musculoskeletal defects noted, no edema  SKIN: 3 mm raised papule with brown pigment at center left axilla. Pea-sized mobile subcutaneous mass right anterior chest. Erythematous scaly right bilateral groin  NEURO: Normal strength and tone, mentation intact and speech normal  PSYCH: mentation appears " normal, affect normal/bright    Diagnostic Test Results:  Results for orders placed or performed in visit on 03/01/17   CT Chest abodmen w/o contrast    Narrative    Examination:  CT CHEST ABDOMEN W/O CONTRAST .     Indication:  Pain, unspecified pain at left costal region with report  of bulging.    Comparison: None.    Technique: CT of Chest  and abdomen was acquired from thoracic inlet  to pubic symphysis without IV contrast and without oral contrast.  Images were reconstructed in axial and coronal sections. Images were  reviewed in lung, soft tissue, liver, bone windows.     Findings:   Chest: 3.5 mm right middle lobe lung nodule series 6 image 27. In the  left lower lobe series 6 image 108, there is a cluster of nodules  medially, may represent infection or sequela of infection. The lungs  are otherwise clear. There are no pleural effusions. The airways are  unremarkable.  There is no axillary, mediastinal, or hilar  lymphadenopathy by size criteria.    Abdomen pelvis: The liver, gallbladder, pancreas, spleen, bilateral  adrenal glands, and kidneys are unremarkable. There is no abdominal  lymphadenopathy by size criteria. The visualized small and large bowel  are normal in caliber.    Bones and subcutaneous tissues: On series 2 image 22, there is slight  asymmetry of the subcutaneous fat the left chest wall just inferior to  the axilla.  No suspicious osseous lesions. Benign linear sclerosis in  L1.        Impression    Impression:   1.  No evidence of herniation or left chest wall mass. Just inferior  to the left axilla there is slight asymmetry in the subcutaneous  tissues with slightly more subcutaneous adipose tissue on left side  relative to the right. This has a benign appearance.  2. 3.5 mm right middle lobe lung nodule.  In a low risk patient of  this age, no follow-up is recommended.  3. A small cluster of nodules in the left lower lobe, likely  postinfectious scar. Could represent acute infection in  "the  appropriate clinical setting.    FELIZ RILEY MD       ASSESSMENT/PLAN:   1. Routine history and physical examination of adult      2. Sebaceous cyst  On chest- reassured this is benign     3. Tinea of groin  Lamisil sent    4. Alcohol abuse, in remission  Patient to return for fasting labs  - Hepatic panel; Future    5. Screening for HIV (human immunodeficiency virus)    - HIV Screening; Future    6. Screen for STD (sexually transmitted disease)    - Neisseria gonorrhoeae PCR; Future  - Chlamydia trachomatis PCR; Future  - Hepatitis B surface antigen; Future  - Hepatitis C antibody; Future    7. Encounter for vitamin deficiency screening  I do not recommend routine vitamin screening. Patient adamant that he wants this- can order a few levels but patient needs to sign ABN and not likely covered by his insurance. He will return for this.  - Vitamin B12; Future  - Folate; Future  - Vitamin D Deficiency; Future    8. CARDIOVASCULAR SCREENING; LDL GOAL LESS THAN 160    - Lipid panel reflex to direct LDL Fasting; Future  - Hemoglobin A1c; Future    9. Changing skin lesion  Return for shave biopsy      COUNSELING:  Reviewed preventive health counseling, as reflected in patient instructions       Regular exercise       Healthy diet/nutrition       Alcohol Use    BP Readings from Last 1 Encounters:   10/24/18 117/73     Estimated body mass index is 25.95 kg/(m^2) as calculated from the following:    Height as of an earlier encounter on 10/24/18: 5' 11.75\" (1.822 m).    Weight as of an earlier encounter on 10/24/18: 190 lb (86.2 kg).      Weight management plan: Discussed healthy diet and exercise guidelines and patient will follow up in 12 months in clinic to re-evaluate.     reports that he has never smoked. He has never used smokeless tobacco.      Counseling Resources:  ATP IV Guidelines  Pooled Cohorts Equation Calculator  FRAX Risk Assessment  ICSI Preventive Guidelines  Dietary Guidelines for Americans, " 2010  USDA's MyPlate  ASA Prophylaxis  Lung CA Screening    MICKEY Arnold CNP  [facility]

## 2018-10-25 RX ORDER — PRENATAL VIT 91/IRON/FOLIC/DHA 28-975-200
COMBINATION PACKAGE (EA) ORAL 2 TIMES DAILY
Qty: 12 G | Refills: 1 | Status: SHIPPED | OUTPATIENT
Start: 2018-10-25 | End: 2020-11-09

## 2018-11-15 ENCOUNTER — OFFICE VISIT (OUTPATIENT)
Dept: AUDIOLOGY | Facility: CLINIC | Age: 38
End: 2018-11-15
Payer: COMMERCIAL

## 2018-11-15 ENCOUNTER — TELEPHONE (OUTPATIENT)
Dept: AUDIOLOGY | Facility: CLINIC | Age: 38
End: 2018-11-15

## 2018-11-15 DIAGNOSIS — H90.3 SNHL (SENSORY-NEURAL HEARING LOSS), ASYMMETRICAL: Primary | ICD-10-CM

## 2018-11-15 DIAGNOSIS — H93.11 RIGHT-SIDED TINNITUS: ICD-10-CM

## 2018-11-15 DIAGNOSIS — H90.41 SENSORINEURAL HEARING LOSS, UNILATERAL, RIGHT EAR, WITH UNRESTRICTED HEARING ON THE CONTRALATERAL SIDE: Primary | ICD-10-CM

## 2018-11-15 PROCEDURE — 92557 COMPREHENSIVE HEARING TEST: CPT | Performed by: AUDIOLOGIST

## 2018-11-15 PROCEDURE — 92567 TYMPANOMETRY: CPT | Performed by: AUDIOLOGIST

## 2018-11-15 PROCEDURE — 99207 ZZC NO CHARGE LOS: CPT | Performed by: AUDIOLOGIST

## 2018-11-15 NOTE — PROGRESS NOTES
AUDIOLOGY REPORT    SUBJECTIVE:  John Valiente is a 38 year old male who was seen in the Audiology Clinic [facility] on 11/15/18 for audiologic evaluation, referred by Dr. Norris.  The patient reports a right ear hearing loss and tinnitus following use of a nail gun (powered by a 22 caliber blank) at work over a year ago. 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 The patient denies  tinnitus in the left ear, bilateral otalgia, bilateral drainage, bilateral aural fullness, family history of hearing loss, and dizziness. They were accompanied today by their self.    OBJECTIVE:    Otoscopic exam indicates ears are clear of cerumen bilaterally     Pure Tone Thresholds assessed using standard techniques  audiometry with good  reliability from 250-8000 Hz bilaterally using insert earphones     RIGHT:  normal hearing sensitivity through 2000 Hz with a mild sensorineural hearing loss at 3000 and 4000 Hz returning to normal hearing sensitivity at 6000 Hz and beyond.     LEFT:    normal hearing sensitivity for all frequencies tested.    NOTE: there is a notch in the left ear at 3000 and 4000 Hz though still in the normal range    Tympanogram:    RIGHT: normal eardrum mobility    LEFT:   normal eardrum mobility    Speech Reception Threshold:    RIGHT: 10 dB HL    LEFT:   10 dB HL    Word Recognition Score:     RIGHT: 100% at 50 dB HL using NU-6 recorded word list.    LEFT:   100% at 50 dB HL using NU-6 recorded word list.      ASSESSMENT:   Sensorineural hearing loss and tinnitus of the right ear.     Today s results were discussed with the patient in detail. Patient requested and was provided a copy of the audiogram.     PLAN:  Patient was counseled regarding hearing loss and impact on communication.  Patient is not a good candidate for amplification at this time.  It is recommended that the patient return as medically indicated or sooner if concerns arise.  It was recommended to the patient use hearing protection whenever engaging in a noisy activity.  Please call this clinic with questions regarding these results or recommendations.      Henry Pierce CCC-A  Licensed Audiologist #[id number]  11/15/2018

## 2018-11-15 NOTE — TELEPHONE ENCOUNTER
Dear Dr. Norris,     To be in compliance with some payers, we must have a Physician's Order to perform Audiology services. Your patient, John, has an appointment to be seen by one of our Audiologists. Please enter a referral order.    We thank you for your cooperation. If you have any questions, please feel free to contact me.    Henry Pierce [facility]-A  Licensed Audiologist #[id number] [phone]

## 2018-11-15 NOTE — MR AVS SNAPSHOT
After Visit Summary   11/15/2018    John Valiente    MRN: [id number]           Patient Information     Date Of Birth          1980        Visit Information        Provider Department      11/15/2018 10:30 AM Henry Rivera AuD [facility]        Today's Diagnoses     Sensorineural hearing loss, unilateral, right ear, with unrestricted hearing on the contralateral side    -  1    Right-sided tinnitus           Follow-ups after your visit        Your next 10 appointments already scheduled     Nov 20, 2018  9:00 AM CST   New Visit with South Lazaro MD   [facility] ([address] [address]   [phone]            Nov 26, 2018  7:20 AM CST   Office Visit with MICKEY Arnold CNP   [facility] ([address] [address]   [phone]           Bring a current list of meds and any records pertaining to this visit. For Physicals, please bring immunization records and any forms needing to be filled out. Please arrive 10 minutes early to complete paperwork.              Who to contact     If you have questions or need follow up information about today's clinic visit or your schedule please contact [facility] directly at [phone].  Normal or non-critical lab and imaging results will be communicated to you by MyChart, letter or phone within 4 business days after the clinic has received the results. If you do not hear from us within 7 days, please contact the clinic through MyChart or phone. If you have a critical or abnormal lab result, we will notify you by phone as soon as possible.  Submit refill requests through [employer] or call your pharmacy and they will forward the refill request to us. Please allow 3 business days for your refill to be completed.          Additional Information About Your Visit        Care EveryWhere ID      This is your Care EveryWhere ID. This could be used by other organizations to access your [facility] medical records  [id number]         Blood Pressure from Last 3 Encounters:   10/24/18 120/66   10/24/18 117/73   10/24/18 120/66    Weight from Last 3 Encounters:   10/24/18 190 lb (86.2 kg)   10/24/18 190 lb (86.2 kg)   10/24/18 190 lb (86.2 kg)              We Performed the Following     AUDIOGRAM/TYMPANOGRAM - INTERFACE     COMPREHENSIVE HEARING TEST     TYMPANOMETRY        Primary Care Provider Fax #    Physician No Ref-Primary [phone]       No address on file        Equal Access to Services     ANGELA MEDELLIN : Hadii radhames crocketto Sotuan, waaxda luqadaha, qaybta kaalmada adeaixayada, jose [place] . So [facility] [phone].    ATENCIÓN: Si habla español, tiene a sanchez disposición servicios gratuitos de asistencia lingüística. Llame al [phone].    We comply with applicable federal civil rights laws and [place] laws. We do not discriminate on the basis of race, color, national origin, age, disability, sex, sexual orientation, or gender identity.            Thank you!     Thank you for choosing [facility]  for your care. Our goal is always to provide you with excellent care. Hearing back from our patients is one way we can continue to improve our services. Please take a few minutes to complete the written survey that you may receive in the mail after your visit with us. Thank you!             Your Updated Medication List - Protect others around you: Learn how to safely use, store and throw away your medicines at www.disposemymeds.org.          This list is accurate as of 11/15/18 11:16 AM.  Always use your most recent med list.                   Brand Name Dispense Instructions for use Diagnosis    KELP PO           magnesium 100 MG Caps           MAGNESIUM OXIDE PO           saccharomyces boulardii 250 MG capsule    FLORASTOR     Take 250 mg by mouth 2 times daily         SUPER GRAPEFRUIT N FIBER DIET PO           terbinafine 1 % cream    lamISIL AT    12 g    Apply topically 2 times daily For fungal infection not resolved with other antifungals (e.g. Clotrimazole)    Tinea of groin       VITAMIN C PO

## 2018-11-16 NOTE — PROGRESS NOTES
"  SUBJECTIVE:   John Valiente is a 38 year old male who presents to clinic today for the following health issues:      Chief Complaint   Patient presents with     Shave Biopsy     Fasting Labs     Patient presents for shave biopsy of changing skin lesion of left axilla. Had removed what he assumed was a dark-colored skin tag or a tic 6 weeks ago and lesion is growing back.    Problem list and histories reviewed & adjusted, as indicated.  Additional history: as documented    Patient Active Problem List   Diagnosis     Inguinal hernia     Back pain     Past Surgical History:   Procedure Laterality Date     C ORAL SURGERY PROCEDURE       LAPAROSCOPIC HERNIORRHAPHY INGUINAL Right 10/2/2015    Procedure: LAPAROSCOPIC HERNIORRHAPHY INGUINAL;  Surgeon: Alexander Vaughan MD;  [facility]: [facility]     TONSILLECTOMY & ADENOIDECTOMY         Social History   Substance Use Topics     Smoking status: Never Smoker     Smokeless tobacco: Never Used     Alcohol use No      Comment: sober x5-6 years     No family history on file.        Reviewed and updated as needed this visit by clinical staff       Reviewed and updated as needed this visit by Provider         ROS:  Constitutional, skin systems are negative, except as otherwise noted.    OBJECTIVE:     /72 (BP Location: Left arm, Patient Position: Chair, Cuff Size: Adult Regular)  Pulse 52  Temp 97.4  F (36.3  C) (Oral)  Ht 5' 11.75\" (1.822 m)  Wt 197 lb (89.4 kg)  SpO2 100%  BMI 26.9 kg/m2  Body mass index is 26.9 kg/(m^2).  GENERAL: healthy, alert and no distress  SKIN:Left posterior axilla with 2 mm brown papule with irregular surface and pigment    Diagnostic Test Results:  none     ASSESSMENT/PLAN:     1. Changing skin lesion  After explaining procedure of shave excision, including risk of bleeding and infection and benefits of lesion removal and tissue diagnosis, the patient agreed to proceed with the procedure. Area was anesthetized with 1% lidocaine with " epinephrine, a total of 1 Lesions cleaned with betadine x 3. Lesion shaved off with Derma Blade. Pressure applied, then drysol for hemostasis. Area dressed with bandaid and bacitracin. Wound care given to the patient. Pt tolerated procedure well, ebl minimal, no complications.    - Surgical pathology exam    2. Screening for HIV (human immunodeficiency virus)    - HIV Screening    3. Alcohol abuse, in remission    - Hepatic panel    4. CARDIOVASCULAR SCREENING; LDL GOAL LESS THAN 160    - Lipid panel reflex to direct LDL Fasting  - Hemoglobin A1c    5. Screen for STD (sexually transmitted disease)    - Neisseria gonorrhoeae PCR  - Chlamydia trachomatis PCR  - Hepatitis B surface antigen  - Hepatitis C antibody    See Patient Instructions    MICKEY Arnold [facility]

## 2018-11-20 ENCOUNTER — OFFICE VISIT (OUTPATIENT)
Dept: OPHTHALMOLOGY | Facility: CLINIC | Age: 38
End: 2018-11-20
Payer: COMMERCIAL

## 2018-11-20 DIAGNOSIS — H52.03 HYPERMETROPIA OF BOTH EYES: Primary | ICD-10-CM

## 2018-11-20 PROCEDURE — 92015 DETERMINE REFRACTIVE STATE: CPT | Performed by: STUDENT IN AN ORGANIZED HEALTH CARE EDUCATION/TRAINING PROGRAM

## 2018-11-20 PROCEDURE — 92004 COMPRE OPH EXAM NEW PT 1/>: CPT | Performed by: STUDENT IN AN ORGANIZED HEALTH CARE EDUCATION/TRAINING PROGRAM

## 2018-11-20 ASSESSMENT — CUP TO DISC RATIO
OS_RATIO: 0.15
OD_RATIO: 0.0

## 2018-11-20 ASSESSMENT — EXTERNAL EXAM - RIGHT EYE: OD_EXAM: NORMAL

## 2018-11-20 ASSESSMENT — CONF VISUAL FIELD
OD_NORMAL: 1
OS_NORMAL: 1

## 2018-11-20 ASSESSMENT — TONOMETRY
IOP_METHOD: ICARE
OD_IOP_MMHG: 24
OS_IOP_MMHG: 22

## 2018-11-20 ASSESSMENT — REFRACTION_MANIFEST
OS_SPHERE: +0.25
OD_CYLINDER: SPHERE
OS_AXIS: 076
OD_SPHERE: +0.25
OS_CYLINDER: +0.25

## 2018-11-20 ASSESSMENT — VISUAL ACUITY
OD_SC+: -1
OS_SC: 1
OD_SC: 1
OS_SC: 20/20
OD_SC: 20/20
METHOD: SNELLEN - LINEAR

## 2018-11-20 ASSESSMENT — SLIT LAMP EXAM - LIDS
COMMENTS: NORMAL
COMMENTS: NORMAL

## 2018-11-20 ASSESSMENT — EXTERNAL EXAM - LEFT EYE: OS_EXAM: NORMAL

## 2018-11-20 NOTE — PROGRESS NOTES
" Current Eye Medications:  None.       Subjective:  Comprehensive Eye Exam.  This is his first eye exam.  No vision concerns or problems.  History of a scratch to his (?) left eye, from a stick, about one year ago.  He had had some photophobia and a scratching sensation intermittently for the first few months after the injury, but is not having issues with the right eye now.    No history of glasses or contact lenses.    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  No family history of glaucoma.  Mother has had vision problems.       Objective:  See Ophthalmology Exam.       Assessment:  John Valiente is a 38 year old male who presents with:   Encounter Diagnosis   Name Primary?     Hypermetropia of both eyes Very mild.  Discussed limited exam without dilation and he understands those risks.       Plan:  Use artificial tears up to four times a day (Refresh Plus, Systane Balance, or generic artificial tears are ok. Avoid \"get the red out\" drops).     South Lazaro MD  [phone]      "

## 2018-11-20 NOTE — PATIENT INSTRUCTIONS
"Use artificial tears up to four times a day (Refresh Plus, Systane Balance, or generic artificial tears are ok. Avoid \"get the red out\" drops).     South Lazaro MD  [phone]    "

## 2018-11-20 NOTE — LETTER
"    11/20/2018         RE: John Valiente  [address] [facility] [address]        Dear Colleague,    Thank you for referring your patient, John Valiente, to the [facility]. Please see a copy of my visit note below.     Current Eye Medications:  None.       Subjective:  Comprehensive Eye Exam.  This is his first eye exam.  No vision concerns or problems.  History of a scratch to his (?) left eye, from a stick, about one year ago.  He had had some photophobia and a scratching sensation intermittently for the first few months after the injury, but is not having issues with the right eye now.    No history of glasses or contact lenses.    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  No family history of glaucoma.  Mother has had vision problems.       Objective:  See Ophthalmology Exam.       Assessment:  John Valiente is a 38 year old male who presents with:   Encounter Diagnosis   Name Primary?     Hypermetropia of both eyes Very mild.       Plan:  Use artificial tears up to four times a day (Refresh Plus, Systane Balance, or generic artificial tears are ok. Avoid \"get the red out\" drops).     South Lazaro MD  [phone]        Again, thank you for allowing me to participate in the care of your patient.        Sincerely,        South Lazaro MD    "

## 2018-11-20 NOTE — MR AVS SNAPSHOT
"              After Visit Summary   11/20/2018    John Valiente    MRN: [id number]           Patient Information     Date Of Birth          1980        Visit Information        Provider Department      11/20/2018 9:00 AM South Lazaro MD [facility]        Today's Diagnoses     Hypermetropia of both eyes    -  1      Care Instructions    Use artificial tears up to four times a day (Refresh Plus, Systane Balance, or generic artificial tears are ok. Avoid \"get the red out\" drops).     South Lazaro MD  [phone]            Follow-ups after your visit        Follow-up notes from your care team     Return if symptoms worsen or fail to improve.      Your next 10 appointments already scheduled     Nov 26, 2018  7:20 AM CST   Office Visit with MICKEY Arnold CNP   [facility] ([facility])    [address] [place] [address] [phone]           Bring a current list of meds and any records pertaining to this visit. For Physicals, please bring immunization records and any forms needing to be filled out. Please arrive 10 minutes early to complete paperwork.              Who to contact     If you have questions or need follow up information about today's clinic visit or your schedule please contact [facility] directly at [phone].  Normal or non-critical lab and imaging results will be communicated to you by MyChart, letter or phone within 4 business days after the clinic has received the results. If you do not hear from us within 7 days, please contact the clinic through MyChart or phone. If you have a critical or abnormal lab result, we will notify you by phone as soon as possible.  Submit refill requests through Banjo or call your pharmacy and they will forward the refill request to us. Please allow 3 business days for your refill to be completed.          Additional Information About Your Visit        Care " EveryWhere ID     This is your Care EveryWhere ID. This could be used by other organizations to access your [facility] medical records  [id number]         Blood Pressure from Last 3 Encounters:   10/24/18 120/66   10/24/18 117/73   10/24/18 120/66    Weight from Last 3 Encounters:   10/24/18 86.2 kg (190 lb)   10/24/18 86.2 kg (190 lb)   10/24/18 86.2 kg (190 lb)              We Performed the Following     EYE EXAM (SIMPLE-NONBILLABLE)     REFRACTIVE STATUS        Primary Care Provider Office Phone # Fax #    Gin Yi Mari, MICKEY [phone]       [address] [facility] [address]        Equal Access to Services     GI MEDELLIN : Hadii radhames ku hadasho Soomaali, waaxda luqadaha, qaybta kaalmada adeegyada, waxay pastorin haybea lunsford . So [facility] [phone].    ATENCIÓN: Si habla español, tiene a sanchez disposición servicios gratuitos de asistencia lingüística. [facility] [phone].    We comply with applicable federal civil rights laws and [place] laws. We do not discriminate on the basis of race, color, national origin, age, disability, sex, sexual orientation, or gender identity.            Thank you!     Thank you for choosing [facility]  for your care. Our goal is always to provide you with excellent care. Hearing back from our patients is one way we can continue to improve our services. Please take a few minutes to complete the written survey that you may receive in the mail after your visit with us. Thank you!             Your Updated Medication List - Protect others around you: Learn how to safely use, store and throw away your medicines at www.disposemymeds.org.          This list is accurate as of 11/20/18  9:53 AM.  Always use your most recent med list.                   Brand Name Dispense Instructions for use Diagnosis    KELP PO           magnesium 100 MG Caps           MAGNESIUM OXIDE PO           saccharomyces boulardii 250 MG capsule     FLORASTOR     Take 250 mg by mouth 2 times daily        SUPER GRAPEFRUIT N FIBER DIET PO           terbinafine 1 % cream    lamISIL AT    12 g    Apply topically 2 times daily For fungal infection not resolved with other antifungals (e.g. Clotrimazole)    Tinea of groin       VITAMIN C PO

## 2018-11-26 ENCOUNTER — OFFICE VISIT (OUTPATIENT)
Dept: FAMILY MEDICINE | Facility: CLINIC | Age: 38
End: 2018-11-26
Payer: COMMERCIAL

## 2018-11-26 VITALS
TEMPERATURE: 97.4 F | DIASTOLIC BLOOD PRESSURE: 72 MMHG | WEIGHT: 197 LBS | HEART RATE: 52 BPM | SYSTOLIC BLOOD PRESSURE: 104 MMHG | BODY MASS INDEX: 26.68 KG/M2 | HEIGHT: 72 IN | OXYGEN SATURATION: 100 %

## 2018-11-26 DIAGNOSIS — F10.11 ALCOHOL ABUSE, IN REMISSION: ICD-10-CM

## 2018-11-26 DIAGNOSIS — Z11.3 SCREEN FOR STD (SEXUALLY TRANSMITTED DISEASE): ICD-10-CM

## 2018-11-26 DIAGNOSIS — Z13.6 CARDIOVASCULAR SCREENING; LDL GOAL LESS THAN 160: ICD-10-CM

## 2018-11-26 DIAGNOSIS — Z11.4 SCREENING FOR HIV (HUMAN IMMUNODEFICIENCY VIRUS): ICD-10-CM

## 2018-11-26 DIAGNOSIS — L98.9 CHANGING SKIN LESION: Primary | ICD-10-CM

## 2018-11-26 LAB
ALBUMIN SERPL-MCNC: 4.1 G/DL (ref 3.4–5)
ALP SERPL-CCNC: 73 U/L (ref 40–150)
ALT SERPL W P-5'-P-CCNC: 69 U/L (ref 0–70)
AST SERPL W P-5'-P-CCNC: 32 U/L (ref 0–45)
BILIRUB DIRECT SERPL-MCNC: 0.1 MG/DL (ref 0–0.2)
BILIRUB SERPL-MCNC: 0.6 MG/DL (ref 0.2–1.3)
CHOLEST SERPL-MCNC: 263 MG/DL
HBA1C MFR BLD: 5.4 % (ref 0–5.6)
HBV SURFACE AG SERPL QL IA: NONREACTIVE
HCV AB SERPL QL IA: NONREACTIVE
HDLC SERPL-MCNC: 73 MG/DL
HIV 1+2 AB+HIV1 P24 AG SERPL QL IA: NONREACTIVE
LDLC SERPL CALC-MCNC: 168 MG/DL
NONHDLC SERPL-MCNC: 190 MG/DL
PROT SERPL-MCNC: 7.4 G/DL (ref 6.8–8.8)
TRIGL SERPL-MCNC: 109 MG/DL

## 2018-11-26 PROCEDURE — 80076 HEPATIC FUNCTION PANEL: CPT | Performed by: NURSE PRACTITIONER

## 2018-11-26 PROCEDURE — 87591 N.GONORRHOEAE DNA AMP PROB: CPT | Performed by: NURSE PRACTITIONER

## 2018-11-26 PROCEDURE — 87389 HIV-1 AG W/HIV-1&-2 AB AG IA: CPT | Performed by: NURSE PRACTITIONER

## 2018-11-26 PROCEDURE — 11300 SHAVE SKIN LESION 0.5 CM/<: CPT | Performed by: NURSE PRACTITIONER

## 2018-11-26 PROCEDURE — 36415 COLL VENOUS BLD VENIPUNCTURE: CPT | Performed by: NURSE PRACTITIONER

## 2018-11-26 PROCEDURE — 87491 CHLMYD TRACH DNA AMP PROBE: CPT | Performed by: NURSE PRACTITIONER

## 2018-11-26 PROCEDURE — 87340 HEPATITIS B SURFACE AG IA: CPT | Performed by: NURSE PRACTITIONER

## 2018-11-26 PROCEDURE — 83036 HEMOGLOBIN GLYCOSYLATED A1C: CPT | Performed by: NURSE PRACTITIONER

## 2018-11-26 PROCEDURE — 88305 TISSUE EXAM BY PATHOLOGIST: CPT | Performed by: NURSE PRACTITIONER

## 2018-11-26 PROCEDURE — 80061 LIPID PANEL: CPT | Performed by: NURSE PRACTITIONER

## 2018-11-26 PROCEDURE — 86803 HEPATITIS C AB TEST: CPT | Performed by: NURSE PRACTITIONER

## 2018-11-26 PROCEDURE — 99212 OFFICE O/P EST SF 10 MIN: CPT | Mod: 25 | Performed by: NURSE PRACTITIONER

## 2018-11-26 NOTE — PATIENT INSTRUCTIONS
[facility] (formerly [facility])[3]    If you have any questions regarding to your visit please contact your care team:       Team Red:   Clinic Hours Telephone Number   Dr. Torrie Mari, NP 7am-7pm  Monday - Thursday   7am-5pm  Fridays  [phone]  (Appointment scheduling available 24/7)   Urgent Care - [place] and [facility] - 11am-9pm Monday-Friday Saturday-Sunday- 9am-5pm   [place] - 5pm-9pm Monday-Friday Saturday-Sunday- 9am-5pm  [phone] - [place]  [phone] - [place]       What options do I have for a visit other than an office visit? We offer electronic visits (e-visits) and telephone visits, when medically appropriate.  Please check with your medical insurance to see if these types of visits are covered, as you will be responsible for any charges that are not paid by your insurance.      You can use Andera (secure electronic communication) to access to your chart, send your primary care provider a message, or make an appointment. Ask a team member how to get started.     For a price quote for your services, please call our Consumer Price Line at [phone] or our Imaging Cost estimation line at [phone] (for imaging tests).    Viridiana LONGORIA MA

## 2018-11-26 NOTE — MR AVS SNAPSHOT
After Visit Summary   11/26/2018    John Valiente    MRN: [id number]           Patient Information     Date Of Birth          1980        Visit Information        Provider Department      11/26/2018 7:20 AM Gin Mari APRN [facility]        Today's Diagnoses     Changing skin lesion    -  1    Screening for HIV (human immunodeficiency virus)        Alcohol abuse, in remission        CARDIOVASCULAR SCREENING; LDL GOAL LESS THAN 160        Encounter for vitamin deficiency screening        Screen for STD (sexually transmitted disease)          Care Instructions    [facility]    If you have any questions regarding to your visit please contact your care team:       Team Red:   Clinic Hours Telephone Number   Dr. Torrie Mari, NP 7am-7pm  Monday - Thursday   7am-5pm  Fridays  [phone]  (Appointment scheduling available 24/7)   Urgent Care - [place] and [facility] - 11am-9pm Monday-Friday Saturday-Sunday- 9am-5pm   [place] - 5pm-9pm Monday-Friday Saturday-Sunday- 9am-5pm  [phone] - [place]  [phone] - [place]       What options do I have for a visit other than an office visit? We offer electronic visits (e-visits) and telephone visits, when medically appropriate.  Please check with your medical insurance to see if these types of visits are covered, as you will be responsible for any charges that are not paid by your insurance.      You can use [employer] (secure electronic communication) to access to your chart, send your primary care provider a message, or make an appointment. Ask a team member how to get started.     For a price quote for your services, please call our Consumer Price Line at [phone] or our Imaging Cost estimation line at [phone] (for imaging tests).    Viridiana LONGORIA MA            Follow-ups after your visit        Your next 10 appointments already  "scheduled     Dec 10, 2018  8:00 AM CST   New Sleep Patient with Quianariazra Cameron Masters MD   [facility] ([facility])    [address] [place] [address] [phone]              Who to contact     If you have questions or need follow up information about today's clinic visit or your schedule please contact [facility] directly at [phone].  Normal or non-critical lab and imaging results will be communicated to you by MyChart, letter or phone within 4 business days after the clinic has received the results. If you do not hear from us within 7 days, please contact the clinic through MyChart or phone. If you have a critical or abnormal lab result, we will notify you by phone as soon as possible.  Submit refill requests through [employer] or call your pharmacy and they will forward the refill request to us. Please allow 3 business days for your refill to be completed.          Additional Information About Your Visit        Care EveryWhere ID     This is your Care EveryWhere ID. This could be used by other organizations to access your [facility] medical records  [id number]        Your Vitals Were     Pulse Temperature Height Pulse Oximetry BMI (Body Mass Index)       52 97.4  F (36.3  C) (Oral) 5' 11.75\" (1.822 m) 100% 26.9 kg/m2        Blood Pressure from Last 3 Encounters:   11/26/18 104/72   10/24/18 120/66   10/24/18 117/73    Weight from Last 3 Encounters:   11/26/18 197 lb (89.4 kg)   10/24/18 190 lb (86.2 kg)   10/24/18 190 lb (86.2 kg)              We Performed the Following     Chlamydia trachomatis PCR     Hemoglobin A1c     Hepatic panel     Hepatitis B surface antigen     Hepatitis C antibody     HIV Screening     Lipid panel reflex to direct LDL Fasting     Neisseria gonorrhoeae PCR     Surgical pathology exam        Primary Care Provider Office Phone # Fax #    MICKEY Arnold CNP " [phone]       [address] [facility] [address]        Equal Access to Services     GI MEDELLIN : Hadii radhames robertson alex Ray, wadayada lulouie, riazta kavickda josé miguel, jose proctor. So [facility] [phone].    ATENCIÓN: Si habla español, tiene a sanchez disposición servicios gratuitos de asistencia lingüística. Llame al [phone].    We comply with applicable federal civil rights laws and [place] laws. We do not discriminate on the basis of race, color, national origin, age, disability, sex, sexual orientation, or gender identity.            Thank you!     Thank you for choosing [facility]  for your care. Our goal is always to provide you with excellent care. Hearing back from our patients is one way we can continue to improve our services. Please take a few minutes to complete the written survey that you may receive in the mail after your visit with us. Thank you!             Your Updated Medication List - Protect others around you: Learn how to safely use, store and throw away your medicines at www.disposemymeds.org.          This list is accurate as of 11/26/18  8:03 AM.  Always use your most recent med list.                   Brand Name Dispense Instructions for use Diagnosis    KELP PO           magnesium 100 MG Caps           MAGNESIUM OXIDE PO           saccharomyces boulardii 250 MG capsule    FLORASTOR     Take 250 mg by mouth 2 times daily        SUPER GRAPEFRUIT N FIBER DIET PO           terbinafine 1 % cream    lamISIL AT    12 g    Apply topically 2 times daily For fungal infection not resolved with other antifungals (e.g. Clotrimazole)    Tinea of groin       VITAMIN C PO

## 2018-11-26 NOTE — LETTER
[address] [place] [address]    November 29, 2018    Socorro Kwon  [address] [facility] [address]          Dear Socorro,    Your results are normal. The spot we removed was a benign (non-cancer) mole    Enclosed is a copy of your results.     Results for orders placed or performed in visit on 11/26/18   Hepatic panel   Result Value Ref Range    Bilirubin Direct 0.1 0.0 - 0.2 mg/dL    Bilirubin Total 0.6 0.2 - 1.3 mg/dL    Albumin 4.1 3.4 - 5.0 g/dL    Protein Total 7.4 6.8 - 8.8 g/dL    Alkaline Phosphatase 73 40 - 150 U/L    ALT 69 0 - 70 U/L    AST 32 0 - 45 U/L   Lipid panel reflex to direct LDL Fasting   Result Value Ref Range    Cholesterol 263 (H) <200 mg/dL    Triglycerides 109 <150 mg/dL    HDL Cholesterol 73 >39 mg/dL    LDL Cholesterol Calculated 168 (H) <100 mg/dL    Non HDL Cholesterol 190 (H) <130 mg/dL   Hemoglobin A1c   Result Value Ref Range    Hemoglobin A1C 5.4 0 - 5.6 %   Hepatitis B surface antigen   Result Value Ref Range    Hep B Surface Agn Nonreactive NR^Nonreactive   Hepatitis C antibody   Result Value Ref Range    Hepatitis C Antibody Nonreactive NR^Nonreactive   HIV Screening   Result Value Ref Range    HIV Antigen Antibody Combo Nonreactive NR^Nonreactive       Surgical pathology exam   Result Value Ref Range    Copath Report       Patient Name: SOCORRO KWON  MR#: [id number]  Specimen #: [id number]  Collected: 11/26/2018  Received: 11/27/2018  Reported: 11/28/2018 15:32  Ordering Phy(s): JAH RIOS    For improved result formatting, select 'View Enhanced Report Format' under   Linked Documents section.    SPECIMEN(S):  Skin, left axilla    FINAL DIAGNOSIS:  Skin, left axilla, lesion, biopsy  - Compound nevus    Electronically signed out by:    Neto Snyder M.D.    GROSS:  A single specimen container with formalin is received labeled with the   patient's name,  "date of birth, and  medical record number. Information on the requisition slip, container, and   associated labels is confirmed.    The specimen is designated \"skin biopsy left axilla\" consisting of a 0.5 x   0.3 cm tan skin shave biopsy with  an eccentric 0.2 x 0.1 x less than 0.1 cm pink raised skin lesion.  The   resection margin is inked black.  Bisected.  Entirely submitted in one cassette. (Dictated by: Karen Sullivan   11/27/2018 09: 52 AM)    MICROSCOPIC:  The epidermis is raised over a nodule composed of nests of banal nevus   cells located in the dermis along with  a few nests at the epidermal dermal junction.  No malignant changes are   seen.    CPT Codes:  A: 28789-EB0    TESTING LAB LOCATION:  [address] [address] [phone]    COLLECTION SITE:  Client: [facility]  Location: [facility] (B)     Neisseria gonorrhoeae PCR   Result Value Ref Range    Specimen Descrip Urine     N Gonorrhea PCR Negative NEG^Negative   Chlamydia trachomatis PCR   Result Value Ref Range    Specimen Description Urine     Chlamydia Trachomatis PCR Negative NEG^Negative       If you have any questions or concerns, please call myself or my nurse at [phone].      Sincerely,        Gin Mari CNP/mauricio  "

## 2018-11-27 ENCOUNTER — TELEPHONE (OUTPATIENT)
Dept: FAMILY MEDICINE | Facility: CLINIC | Age: 38
End: 2018-11-27

## 2018-11-27 PROBLEM — E78.5 HYPERLIPIDEMIA LDL GOAL <160: Status: ACTIVE | Noted: 2018-11-27

## 2018-11-27 LAB
C TRACH DNA SPEC QL NAA+PROBE: NEGATIVE
N GONORRHOEA DNA SPEC QL NAA+PROBE: NEGATIVE
SPECIMEN SOURCE: NORMAL
SPECIMEN SOURCE: NORMAL

## 2018-11-27 NOTE — TELEPHONE ENCOUNTER
Patient returned call to RN Hotline.   Called and spoke with patient & notified of provider's results as written.   Verbalizes understanding & no further questions.     Nithya Anand, RN

## 2018-11-27 NOTE — TELEPHONE ENCOUNTER
Notes Recorded by Nithya Anand RN on 11/27/2018 at 4:40 PM  Left message for patient to call RN hotline [phone].   See TE.     Nithya Anand RN  ------    Notes Recorded by Gin Mari APRN CNP on 11/27/2018 at 3:21 PM  Please call the patient with these results:    -Cholesterol is high with an LDL of 168. I recommend checking this annually- if LDL reaches 190, then medications are indicated. Regular exercise, a diet low in simple carbs and saturated fat, and weight loss can improve this. An appointment with the dietician can really help- please let me know if you would like a referral.  -STD screening panel is negative.  -Normal diabetes screening (A1c) and normal liver panel.    DAPHNIE Murray RN

## 2018-11-27 NOTE — PROGRESS NOTES
Please call the patient with these results:    -Cholesterol is high with an LDL of 168. I recommend checking this annually- if LDL reaches 190, then medications are indicated. Regular exercise, a diet low in simple carbs and saturated fat, and weight loss can improve this. An appointment with the dietician can really help- please let me know if you would like a referral.  -STD screening panel is negative.  -Normal diabetes screening (A1c) and normal liver panel.    Gin Mari, CNP

## 2018-11-28 LAB — COPATH REPORT: NORMAL

## 2018-11-28 NOTE — PROGRESS NOTES
Please mail letter:    Your results are normal. The spot we removed was a benign (non-cancer) mole.    Gin Mari, CNP

## 2018-12-07 ENCOUNTER — PRE VISIT (OUTPATIENT)
Dept: SLEEP MEDICINE | Facility: CLINIC | Age: 38
End: 2018-12-07

## 2018-12-07 NOTE — TELEPHONE ENCOUNTER
"  1.  Reason for the visit:  Consult to discuss snoring  2.  Referring provider and clinic name:  Dr. Kamaljit Meza   3.  Previous Sleep Doctor or Pulmonlogist (clinic name)?  None noted  4.  Records, Procedures, Imaging, and Labs (see below)  No records to obtain        All NOTES from previous office visits that pertain to why they are being seen in the Sleep Center    Previous Sleep Studies, Chest CT, Echos and reports that pertain to why they are seeing Sleep Center    All Sleep records that have been done in the last 2 years that pertain to why they are seeing Sleep Center            Are they being seen for continuation of care for Cpap/Bipap/Avap/Trilogy/Dental Device?     If yes to above Who and Where was Device issued/currently getting supplies from?     Are you currently on \"Supplemental Oxygen\" during the day or night?                                                                                                                                                         Please remind pt to bring Cpap machine and ask to arrive 15 minutes early to appointment due traffic and congestion                                                 5. Pt Sleep Center Packet received Message left asking pt to arrive 30 minutes early to appointment if no packet received.        Yes: \"please make sure that you bring this to your appointment completed, either the doctor will not see you until this completed or you may be asked to reschedule your appointment.\"     No: mail or email to the pt and explain, \"please make sure that you bring this to your appointment completed, either the doctor will not see you until this completed or you may be asked to reschedule your appointment.\"     ~If pt coming early to fill packet out, ask that they come 30 minutes prior to their appointment~     6. Has the pt's medication list been updated and preferred pharmacy added?     7. Has the allergy list been reviewed?    \"Thank you for " "choosing [facility] and we look forward to seeing you at your upcoming appointment\"     "

## 2020-06-26 ENCOUNTER — NURSE TRIAGE (OUTPATIENT)
Dept: NURSING | Facility: CLINIC | Age: 40
End: 2020-06-26

## 2020-06-26 ENCOUNTER — VIRTUAL VISIT (OUTPATIENT)
Dept: FAMILY MEDICINE | Facility: OTHER | Age: 40
End: 2020-06-26

## 2020-06-26 NOTE — PROGRESS NOTES
"Date: 2020 09:35:30  Clinician: Melia Interiano  Clinician NPI: [id number]  Patient: John Valiente  Patient : 1980  Patient Address: [address] [place] [address]  Patient Phone: [phone]  Visit Protocol: URI  Patient Summary:  John is a 40 year old ( : 1980 ) male who initiated a Visit for COVID-19 (Coronavirus) evaluation and screening. When asked the question \"Please sign me up to receive news, health information and promotions from [employer].\", John responded \"No\".    John states his symptoms started 1-2 days ago.   His symptoms consist of nausea, diarrhea, malaise, ear pain, chills, a sore throat, enlarged lymph nodes, myalgia, a cough, and nasal congestion.   Symptom details     Nasal secretions: The color of his mucus is clear.    Cough: John coughs a few times an hour and his cough is not more bothersome at night. Phlegm does not come into his throat when he coughs. He does not believe his cough is caused by post-nasal drip.     Sore throat: John reports having mild throat pain (1-3 on a 10 point pain scale), does not have exudate on his tonsils, and can swallow liquids. The lymph nodes in his neck are enlarged. A rash has not appeared on the skin since the sore throat started.      John denies having wheezing, teeth pain, ageusia, vomiting, rhinitis, headache, anosmia, facial pain or pressure, and fever. He also denies having recent facial or sinus surgery in the past 60 days and taking antibiotic medication in the past month. He is not experiencing dyspnea.   Precipitating events  Within the past week, John has not been exposed to someone with strep throat. He has not recently been exposed to someone with influenza. John has not been in close contact with any high risk individuals.   Pertinent COVID-19 (Coronavirus) information  In the past 14 days, John has not worked in a congregate living setting.   He does not work or volunteer as " healthcare worker or a  and does not work or volunteer in a healthcare facility.   John also has not lived in a congregate living setting in the past 14 days. He does not live with a healthcare worker.   John has not had a close contact with a laboratory-confirmed COVID-19 patient within 14 days of symptom onset.   Pertinent medical history  John does not need a return to work/school note.   Weight: 185 lbs   John does not smoke or use smokeless tobacco.   Weight: 185 lbs    MEDICATIONS: No current medications, ALLERGIES: NKDA  Clinician Response:  Dear John,   Your symptoms show that you may have coronavirus (COVID-19). This illness can cause fever, cough and trouble breathing. Many people get a mild case and get better on their own. Some people can get very sick.  What should I do?  We would like to test you for this virus.   1. Please call [phone] to schedule your visit. Explain that you were referred by [facility] to have a COVID-19 test. Be ready to share your [facility] visit ID number.  The following will serve as your written order for this COVID Test, ordered by me, for the indication of suspected COVID [Z20.828]: The test will be ordered in [employer], our electronic health record, after you are scheduled. It will show as ordered and authorized by Joseph Zaldivar MD.  Order: COVID-19 (Coronavirus) PCR for SYMPTOMATIC testing from [facility].      2. When it's time for your COVID test:  Stay at least 6 feet away from others. (If someone will drive you to your test, stay in the backseat, as far away from the  as you can.)   Cover your mouth and nose with a mask, tissue or washcloth.  Go straight to the testing site. Don't make any stops on the way there or back.      3.Starting now: Stay home and away from others (self-isolate) until:   You've had no fever---and no medicine that reduces fever---for 3 full days (72 hours). And...   Your other symptoms have gotten better. For example, your  "cough or breathing has improved. And...   At least 10 days have passed since your symptoms started.       During this time, don't leave the house except for testing or medical care.   Stay in your own room, even for meals. Use your own bathroom if you can.   Stay away from others in your home. No hugging, kissing or shaking hands. No visitors.  Don't go to work, school or anywhere else.    Clean \"high touch\" surfaces often (doorknobs, counters, handles, etc.). Use a household cleaning spray or wipes. You'll find a full list of  on the EPA website: www.epa.gov/pesticide-registration/list-n-disinfectants-use-against-sars-cov-2.   Cover your mouth and nose with a mask, tissue or washcloth to avoid spreading germs.  Wash your hands and face often. Use soap and water.  Caregivers in these groups are at risk for severe illness due to COVID-19:  o People 65 years and older  o People who live in a nursing home or long-term care facility  o People with chronic disease (lung, heart, cancer, diabetes, kidney, liver, immunologic)  o People who have a weakened immune system, including those who:   Are in cancer treatment  Take medicine that weakens the immune system, such as corticosteroids  Had a bone marrow or organ transplant  Have an immune deficiency  Have poorly controlled HIV or AIDS  Are obese (body mass index of 40 or higher)  Smoke regularly   o Caregivers should wear gloves while washing dishes, handling laundry and cleaning bedrooms and bathrooms.  o Use caution when washing and drying laundry: Don't shake dirty laundry, and use the warmest water setting that you can.  o For more tips, go to www.cdc.gov/coronavirus/2019-ncov/downloads/10Things.pdf.      How can I take care of myself?   Get lots of rest. Drink extra fluids (unless a doctor has told you not to).   Take Tylenol (acetaminophen) for fever or pain. If you have liver or kidney problems, ask your family doctor if it's okay to take Tylenol.   Adults " can take either:    650 mg (two 325 mg pills) every 4 to 6 hours, or...   1,000 mg (two 500 mg pills) every 8 hours as needed.    Note: Don't take more than 3,000 mg in one day. Acetaminophen is found in many medicines (both prescribed and over-the-counter medicines). Read all labels to be sure you don't take too much.   For children, check the Tylenol bottle for the right dose. The dose is based on the child's age or weight.    If you have other health problems (like cancer, heart failure, an organ transplant or severe kidney disease): Call your specialty clinic if you don't feel better in the next 2 days.       Know when to call 911. Emergency warning signs include:    Trouble breathing or shortness of breath Pain or pressure in the chest that doesn't go away Feeling confused like you haven't felt before, or not being able to wake up Bluish-colored lips or face.  Where can I get more information?   [facility] -- About COVID-19: [email or web address]   CDC -- What to Do If You're Sick: www.cdc.gov/coronavirus/2019-ncov/about/steps-when-sick.html   CDC -- Ending Home Isolation: www.cdc.gov/coronavirus/2019-ncov/hcp/disposition-in-home-patients.html   CDC -- Caring for Someone: www.cdc.gov/coronavirus/2019-ncov/if-you-are-sick/care-for-someone.html   [facility] -- Interim Guidance for Hospital Discharge to Home: www.health.[facility].mn.us/diseases/coronavirus/hcp/hospdischarge.pdf   [facility] clinical trials (COVID-19 research studies): clinicalaffairs.[facility]-clinical-trials    Below are the COVID-19 hotlines at the [place] Department of Health ([facility]). Interpreters are available.    For health questions: Call 099-587-7795 or 1-838.401.8173 (7 a.m. to 7 p.m.) For questions about schools and childcare: Call 082-423-2503 or 1-244.245.6558 (7 a.m. to 7 p.m.)    Diagnosis: Cough  Diagnosis ICD: R05

## 2020-06-26 NOTE — TELEPHONE ENCOUNTER
He wants to be tested for covid-19. He has diarrhea, abdominal pain rated at 7, fatigue, body aches. I connected with scheduling for a telephone appointment. I told him it's up to the MD he talks with as to whether or not they'll test for covid19. He didn't want to go to the ER. He said he has blackish diarrhea stools. I explained that can be blood in the stool. He said he doesn't think that's it.  Candy Bowers RN  [facility]      Reason for Disposition    Bloody, black, or tarry bowel movements    Additional Information    Negative: Shock suspected (e.g., cold/pale/clammy skin, too weak to stand, low BP, rapid pulse)    Negative: Difficult to awaken or acting confused (e.g., disoriented, slurred speech)    Negative: Sounds like a life-threatening emergency to the triager    Negative: Vomiting also present and worse than the diarrhea    Negative: Blood in stool and without diarrhea    Negative: SEVERE abdominal pain (e.g., excruciating) and present > 1 hour     Pain at 7    Negative: SEVERE abdominal pain and age > 60    Protocols used: DIARRHEA-A-OH

## 2020-07-01 ENCOUNTER — VIRTUAL VISIT (OUTPATIENT)
Dept: FAMILY MEDICINE | Facility: OTHER | Age: 40
End: 2020-07-01

## 2020-07-01 NOTE — PROGRESS NOTES
"Date: 2020 11:04:15  Clinician: Christiano Cruz  Clinician NPI: [id number]  Patient: John Valiente  Patient : 1980  Patient Address: [address] [place] [address]  Patient Phone: [phone]  Visit Protocol: URI  Patient Summary:  John is a 40 year old ( : 1980 ) male who initiated a Visit for COVID-19 (Coronavirus) evaluation and screening. When asked the question \"Please sign me up to receive news, health information and promotions from [employer].\", John responded \"No\".    John states his symptoms started suddenly 7-9 days ago. After his symptoms started, they improved and then got worse again.   His symptoms consist of nausea, diarrhea, malaise, a headache, a sore throat, enlarged lymph nodes, myalgia, and a cough. John also feels feverish.   Symptom details     Cough: John coughs a few times an hour and his cough is not more bothersome at night. Phlegm does not come into his throat when he coughs. He does not believe his cough is caused by post-nasal drip.     Sore throat: John reports having mild throat pain (1-3 on a 10 point pain scale), does not have exudate on his tonsils, and can swallow liquids. The lymph nodes in his neck are enlarged. A rash has not appeared on the skin since the sore throat started.     Temperature: His current temperature is 99 degrees Fahrenheit.     Headache: He states the headache is mild (1-3 on a 10 point pain scale).      John denies having wheezing, teeth pain, ageusia, vomiting, rhinitis, ear pain, chills, anosmia, facial pain or pressure, and nasal congestion. He also denies having recent facial or sinus surgery in the past 60 days, taking antibiotic medication in the past month, and having a sinus infection within the past year. He is not experiencing dyspnea.   Precipitating events  Within the past week, John has not been exposed to someone with strep throat. He has not recently been exposed to someone with " influenza. John has not been in close contact with any high risk individuals.   Pertinent COVID-19 (Coronavirus) information  In the past 14 days, John has not worked in a congregate living setting.   He does not work or volunteer as healthcare worker or a  and does not work or volunteer in a healthcare facility.   John also has not lived in a congregate living setting in the past 14 days. He does not live with a healthcare worker.   John has not had a close contact with a laboratory-confirmed COVID-19 patient within 14 days of symptom onset.   Pertinent medical history  John does not need a return to work/school note.   Weight: 185 lbs   John does not smoke or use smokeless tobacco.   Weight: 185 lbs    MEDICATIONS: No current medications, ALLERGIES: NKDA  Clinician Response:  Dear John,   Your symptoms show that you may have coronavirus (COVID-19). This illness can cause fever, cough and trouble breathing. Many people get a mild case and get better on their own. Some people can get very sick.  What should I do?  We would like to test you for this virus.   1. Please call [phone] to schedule your visit. Explain that you were referred by [facility] to have a COVID-19 test. Be ready to share your [facility] visit ID number.  The following will serve as your written order for this COVID Test, ordered by me, for the indication of suspected COVID [Z20.828]: The test will be ordered in [employer], our electronic health record, after you are scheduled. It will show as ordered and authorized by Joseph Zaldivar MD.  Order: COVID-19 (Coronavirus) PCR for SYMPTOMATIC testing from [facility].      2. When it's time for your COVID test:  Stay at least 6 feet away from others. (If someone will drive you to your test, stay in the backseat, as far away from the  as you can.)   Cover your mouth and nose with a mask, tissue or washcloth.  Go straight to the testing site. Don't make any stops on the way there  "or back.      3.Starting now: Stay home and away from others (self-isolate) until:   You've had no fever---and no medicine that reduces fever---for 3 full days (72 hours). And...   Your other symptoms have gotten better. For example, your cough or breathing has improved. And...   At least 10 days have passed since your symptoms started.       During this time, don't leave the house except for testing or medical care.   Stay in your own room, even for meals. Use your own bathroom if you can.   Stay away from others in your home. No hugging, kissing or shaking hands. No visitors.  Don't go to work, school or anywhere else.    Clean \"high touch\" surfaces often (doorknobs, counters, handles, etc.). Use a household cleaning spray or wipes. You'll find a full list of  on the EPA website: www.epa.gov/pesticide-registration/list-n-disinfectants-use-against-sars-cov-2.   Cover your mouth and nose with a mask, tissue or washcloth to avoid spreading germs.  Wash your hands and face often. Use soap and water.  Caregivers in these groups are at risk for severe illness due to COVID-19:  o People 65 years and older  o People who live in a nursing home or long-term care facility  o People with chronic disease (lung, heart, cancer, diabetes, kidney, liver, immunologic)  o People who have a weakened immune system, including those who:   Are in cancer treatment  Take medicine that weakens the immune system, such as corticosteroids  Had a bone marrow or organ transplant  Have an immune deficiency  Have poorly controlled HIV or AIDS  Are obese (body mass index of 40 or higher)  Smoke regularly   o Caregivers should wear gloves while washing dishes, handling laundry and cleaning bedrooms and bathrooms.  o Use caution when washing and drying laundry: Don't shake dirty laundry, and use the warmest water setting that you can.  o For more tips, go to www.cdc.gov/coronavirus/2019-ncov/downloads/10Things.pdf.    4.Sign up for [employer] " [employer]. We know it's scary to hear that you might have COVID-19. We want to track your symptoms to make sure you're okay over the next 2 weeks. Please look for an email from [employer]---this is a free, online program that we'll use to keep in touch. To sign up, follow the link in the email. Learn more at [email or web address]  How can I take care of myself?   Get lots of rest. Drink extra fluids (unless a doctor has told you not to).   Take Tylenol (acetaminophen) for fever or pain. If you have liver or kidney problems, ask your family doctor if it's okay to take Tylenol.   Adults can take either:    650 mg (two 325 mg pills) every 4 to 6 hours, or...   1,000 mg (two 500 mg pills) every 8 hours as needed.    Note: Don't take more than 3,000 mg in one day. Acetaminophen is found in many medicines (both prescribed and over-the-counter medicines). Read all labels to be sure you don't take too much.   For children, check the Tylenol bottle for the right dose. The dose is based on the child's age or weight.    If you have other health problems (like cancer, heart failure, an organ transplant or severe kidney disease): Call your specialty clinic if you don't feel better in the next 2 days.       Know when to call 911. Emergency warning signs include:    Trouble breathing or shortness of breath Pain or pressure in the chest that doesn't go away Feeling confused like you haven't felt before, or not being able to wake up Bluish-colored lips or face.  Where can I get more information?    [facility] -- About COVID-19: [email or web address]   CDC -- What to Do If You're Sick: www.cdc.gov/coronavirus/2019-ncov/about/steps-when-sick.html   CDC -- Ending Home Isolation: www.cdc.gov/coronavirus/2019-ncov/hcp/disposition-in-home-patients.html   CDC -- Caring for Someone: www.cdc.gov/coronavirus/2019-ncov/if-you-are-sick/care-for-someone.html   [facility] -- Interim Guidance for Hospital Discharge to Home:  www.health.[facility].mn.us/diseases/coronavirus/hcp/hospdischarge.pdf   [facility] clinical trials (COVID-19 research studies): clinicalaffairs.[facility]/umn-clinical-trials    Below are the COVID-19 hotlines at the [place] Department of Health ([facility]). Interpreters are available.    For health questions: Call 584-938-1379 or 1-235.801.4947 (7 a.m. to 7 p.m.) For questions about schools and childcare: Call 611-719-6672 or 1-875.303.4178 (7 a.m. to 7 p.m.)    Diagnosis: Other malaise  Diagnosis ICD: R53.81

## 2020-07-02 DIAGNOSIS — Z20.822 SUSPECTED COVID-19 VIRUS INFECTION: Primary | ICD-10-CM

## 2020-07-02 PROCEDURE — U0003 INFECTIOUS AGENT DETECTION BY NUCLEIC ACID (DNA OR RNA); SEVERE ACUTE RESPIRATORY SYNDROME CORONAVIRUS 2 (SARS-COV-2) (CORONAVIRUS DISEASE [COVID-19]), AMPLIFIED PROBE TECHNIQUE, MAKING USE OF HIGH THROUGHPUT TECHNOLOGIES AS DESCRIBED BY CMS-2020-01-R: HCPCS | Performed by: FAMILY MEDICINE

## 2020-07-02 NOTE — LETTER
July 5, 2020        John RAFAL Valiente  [address] [address]    This letter provides a written record that you were tested for COVID-19 on 7/2/20.       Your result was negative. This means that we didn t find the virus that causes COVID-19 in your sample. A test may show negative when you do actually have the virus. This can happen when the virus is in the early stages of infection, before you feel illness symptoms.    If you have symptoms   Stay home and away from others (self-isolate) until you meet ALL of the guidelines below:    You ve had no fever--and no medicine that reduces fever--for 3 full days (72 hours). And      Your other symptoms have gotten better. For example, your cough or breathing has improved. And     At least 10 days have passed since your symptoms started.    During this time:    Stay home. Don t go to work, school or anywhere else.     Stay in your own room, including for meals. Use your own bathroom if you can.    Stay away from others in your home. No hugging, kissing or shaking hands. No visitors.    Clean  high touch  surfaces often (doorknobs, counters, handles, etc.). Use a household cleaning spray or wipes. You can find a full list on the EPA website at www.epa.gov/pesticide-registration/list-n-disinfectants-use-against-sars-cov-2.    Cover your mouth and nose with a mask, tissue or washcloth to avoid spreading germs.    Wash your hands and face often with soap and water.    Going back to work  Check with your employer for any guidelines to follow for going back to work.    Employers: This document serves as formal notice that your employee tested negative for COVID-19, as of the testing date shown above.

## 2020-07-03 LAB
SARS-COV-2 RNA SPEC QL NAA+PROBE: NOT DETECTED
SPECIMEN SOURCE: NORMAL

## 2020-11-09 ENCOUNTER — OFFICE VISIT (OUTPATIENT)
Dept: FAMILY MEDICINE | Facility: CLINIC | Age: 40
End: 2020-11-09
Payer: COMMERCIAL

## 2020-11-09 VITALS
HEIGHT: 71 IN | WEIGHT: 202.4 LBS | BODY MASS INDEX: 28.34 KG/M2 | TEMPERATURE: 98.1 F | OXYGEN SATURATION: 99 % | DIASTOLIC BLOOD PRESSURE: 76 MMHG | SYSTOLIC BLOOD PRESSURE: 125 MMHG | HEART RATE: 57 BPM

## 2020-11-09 DIAGNOSIS — Z30.09 ENCOUNTER FOR VASECTOMY COUNSELING: Primary | ICD-10-CM

## 2020-11-09 PROCEDURE — 99213 OFFICE O/P EST LOW 20 MIN: CPT | Performed by: FAMILY MEDICINE

## 2020-11-09 RX ORDER — DIAZEPAM 10 MG
10 TABLET ORAL ONCE
Qty: 1 TABLET | Refills: 0 | Status: SHIPPED | OUTPATIENT
Start: 2020-11-09 | End: 2020-11-09

## 2020-11-09 SDOH — HEALTH STABILITY: MENTAL HEALTH: HOW OFTEN DO YOU HAVE A DRINK CONTAINING ALCOHOL?: NOT ASKED

## 2020-11-09 SDOH — HEALTH STABILITY: MENTAL HEALTH: HOW OFTEN DO YOU HAVE 6 OR MORE DRINKS ON ONE OCCASION?: NOT ASKED

## 2020-11-09 SDOH — HEALTH STABILITY: MENTAL HEALTH: HOW MANY STANDARD DRINKS CONTAINING ALCOHOL DO YOU HAVE ON A TYPICAL DAY?: NOT ASKED

## 2020-11-09 SDOH — ECONOMIC STABILITY: TRANSPORTATION INSECURITY
IN THE PAST 12 MONTHS, HAS THE LACK OF TRANSPORTATION KEPT YOU FROM MEDICAL APPOINTMENTS OR FROM GETTING MEDICATIONS?: NOT ASKED

## 2020-11-09 SDOH — ECONOMIC STABILITY: FOOD INSECURITY: WITHIN THE PAST 12 MONTHS, YOU WORRIED THAT YOUR FOOD WOULD RUN OUT BEFORE YOU GOT MONEY TO BUY MORE.: NOT ASKED

## 2020-11-09 SDOH — ECONOMIC STABILITY: FOOD INSECURITY: WITHIN THE PAST 12 MONTHS, THE FOOD YOU BOUGHT JUST DIDN'T LAST AND YOU DIDN'T HAVE MONEY TO GET MORE.: NOT ASKED

## 2020-11-09 SDOH — ECONOMIC STABILITY: INCOME INSECURITY: HOW HARD IS IT FOR YOU TO PAY FOR THE VERY BASICS LIKE FOOD, HOUSING, MEDICAL CARE, AND HEATING?: NOT ASKED

## 2020-11-09 SDOH — ECONOMIC STABILITY: TRANSPORTATION INSECURITY
IN THE PAST 12 MONTHS, HAS LACK OF TRANSPORTATION KEPT YOU FROM MEETINGS, WORK, OR FROM GETTING THINGS NEEDED FOR DAILY LIVING?: NOT ASKED

## 2020-11-09 ASSESSMENT — MIFFLIN-ST. JEOR: SCORE: 1856.21

## 2020-11-09 NOTE — PROGRESS NOTES
"Subjective     John Valiente is a 40 year old male who presents to clinic today for the following health issues:    HPI         Vasectomy consult    Patient is here today to consult for a vasectomy.  He has 3 children.  His wife is currently using an IUD for birth control.  They are close to 100% certain they do not want anymore children.  He did have a number of appropriate questions today.    His past medical history, medications, surgical history, family history, and allergies were reviewed today.    Review of Systems   Constitutional, HEENT, cardiovascular, pulmonary, gi and gu systems are negative, except as otherwise noted.      Objective    /76 (BP Location: Left arm, Patient Position: Sitting, Cuff Size: Adult Large)   Pulse 57   Temp 98.1  F (36.7  C) (Oral)   Ht 1.805 m (5' 11.06\")   Wt 91.8 kg (202 lb 6.4 oz)   SpO2 99%   BMI 28.18 kg/m    Body mass index is 28.18 kg/m .  Physical Exam   GENERAL: healthy, alert and no distress  EYES: Eyes grossly normal to inspection, PERRL and conjunctivae and sclerae normal  NECK: no adenopathy, no asymmetry, masses, or scars and thyroid normal to palpation  RESP: lungs clear to auscultation - no rales, rhonchi or wheezes  CV: regular rate and rhythm, normal S1 S2, no S3 or S4, no murmur, click or rub, no peripheral edema and peripheral pulses strong   (male): normal male genitalia without lesions or urethral discharge, no hernia   (male): testicles normal without atrophy or masses, no hernias, penis normal without urethral discharge and bilateral vas deferens were isolated and brought to the midline with minimal difficulty.  MS: no gross musculoskeletal defects noted, no edema  SKIN: no suspicious lesions or rashes  NEURO: Normal strength and tone, mentation intact and speech normal  PSYCH: mentation appears normal, affect normal/bright            Assessment & Plan     Encounter for vasectomy counseling  We reviewed the vasectomy information.  I " "answered his questions today.  I offered a prescription for Valium that he could use as needed.  Information was given regarding scheduling and he was urged to contact me if he has any questions before doing so.  - diazepam (VALIUM) 10 MG tablet; Take 1 tablet (10 mg) by mouth once for 1 dose 30-60 minutes before procedure, must have a      BMI:   Estimated body mass index is 28.18 kg/m  as calculated from the following:    Height as of this encounter: 1.805 m (5' 11.06\").    Weight as of this encounter: 91.8 kg (202 lb 6.4 oz).                No follow-ups on file.    Ady Scott MD  [facility]    "

## 2021-06-07 ENCOUNTER — THERAPY VISIT (OUTPATIENT)
Dept: PHYSICAL THERAPY | Facility: CLINIC | Age: 41
End: 2021-06-07
Payer: COMMERCIAL

## 2021-06-07 DIAGNOSIS — M25.561 ACUTE PAIN OF RIGHT KNEE: ICD-10-CM

## 2021-06-07 PROCEDURE — 97110 THERAPEUTIC EXERCISES: CPT | Mod: GP | Performed by: PHYSICAL THERAPIST

## 2021-06-07 PROCEDURE — 97161 PT EVAL LOW COMPLEX 20 MIN: CPT | Mod: GP | Performed by: PHYSICAL THERAPIST

## 2021-06-07 PROCEDURE — 97140 MANUAL THERAPY 1/> REGIONS: CPT | Mod: GP | Performed by: PHYSICAL THERAPIST

## 2021-06-07 NOTE — PROGRESS NOTES
Physical Therapy Initial Evaluation  Subjective:  The history is provided by the patient.   Patient Health History  John Valiente being seen for Rt knee, hip pain from running .     Date of Onset: ongoing    Problem occurred: falls, work    Pain is reported as 7/10 on pain scale.  General health as reported by patient is excellent.  Pertinent medical history includes: none. Other medical history details: LBP-chronic .   Red flags:  None as reported by patient.         Current medications:  Other.    Current occupation is contractor .                     Therapist Generated HPI Evaluation  Problem details: Pt reports recurring knee, hip pain that hinders his running.  He describes a burning pain from his Rt glut,lat hip to his knee. He admits a long hx of LBP and related issues.  .         Type of problem:  Right knee.    This is a chronic condition.  Condition occurred with:  Repetition/overuse.    Patient reports pain:  Lateral.  Pain is described as burning, stabbing and aching and is intermittent.  Pain radiates to:  Hip, thigh and knee. Pain is worse in the P.M..  Since onset symptoms are gradually improving.  Associated symptoms:  Loss of motion/stiffness. Symptoms are exacerbated by bending/squatting, walking, running and standing  and relieved by nothing.    Previous treatment includes chiropractic.                           Objective:  System         Lumbar/SI Evaluation                      SI joint/Sacrum:          Left negative at:    I[facility] [employer]  Right positive at:    [employer]  Sacral conclusion left:  Descended pubic sym  Sacral conclusion right:  Posterior inominate, outflare, sacral torsion, upslip and elevated pubic sym                                       Knee Evaluation:  ROM:  AROM: normal  PROM: normal  Strength:  Normal            Ligament Testing:  Normal                Special Tests:     Right knee positive for the following tests:  Tai's and IT Band Friction  Right knee  negative for the following special tests:  Meniscal and Patellar Compression  Palpation:      Right knee tenderness present at:  Patellar Tendon; IT Band and Patellar Inferior  Edema:  Normal            General     ROS    Assessment/Plan:    Patient is a 41 year old male with pelvic, right side hip and right side knee complaints.    Patient has the following significant findings with corresponding treatment plan.                Diagnosis 1:  Rt ITband syndr/Knee pain   Pain -  hot/cold therapy, US and manual therapy  Decreased ROM/flexibility - manual therapy, therapeutic exercise and home program  Decreased joint mobility - manual therapy and therapeutic exercise  Decreased proprioception - neuro re-education and therapeutic activities  Inflammation - cold therapy and US  Impaired muscle performance - neuro re-education and home program  Decreased function - therapeutic activities and home program    Therapy Evaluation Codes:   1) History comprised of:   Personal factors that impact the plan of care:      Coping style, Overall behavior pattern and Past/current experiences.    Comorbidity factors that impact the plan of care are:      Chemical Dependency.     Medications impacting care: Pain.  2) Examination of Body Systems comprised of:   Body structures and functions that impact the plan of care:      Hip, Knee, Pelvis and Sacral illiac joint.   Activity limitations that impact the plan of care are:      Bending, Driving, Lifting, Reading/Computer work, Running, Sitting, Squatting/kneeling, Stairs and Walking.  3) Clinical presentation characteristics are:   Stable/Uncomplicated.  4) Decision-Making    Low complexity using standardized patient assessment instrument and/or measureable assessment of functional outcome.  Cumulative Therapy Evaluation is: Low complexity.    Previous and current functional limitations:  (See Goal Flow Sheet for this information)    Short term and Long term goals: (See Goal Flow Sheet  for this information)     Communication ability:  Patient appears to be able to clearly communicate and understand verbal and written communication and follow directions correctly.  Treatment Explanation - The following has been discussed with the patient:   RX ordered/plan of care  Anticipated outcomes  Possible risks and side effects  This patient would benefit from PT intervention to resume normal activities.   Rehab potential is good.    Frequency:  1 X week, once daily  Duration:  for 8 weeks  Discharge Plan:  Achieve all LTG.  Independent in home treatment program.  Reach maximal therapeutic benefit.    Please refer to the daily flowsheet for treatment today, total treatment time and time spent performing 1:1 timed codes.

## 2021-06-10 ENCOUNTER — THERAPY VISIT (OUTPATIENT)
Dept: PHYSICAL THERAPY | Facility: CLINIC | Age: 41
End: 2021-06-10
Payer: COMMERCIAL

## 2021-06-10 DIAGNOSIS — M25.561 ACUTE PAIN OF RIGHT KNEE: ICD-10-CM

## 2021-06-10 PROCEDURE — 97140 MANUAL THERAPY 1/> REGIONS: CPT | Mod: GP | Performed by: PHYSICAL THERAPIST

## 2021-06-10 PROCEDURE — 97110 THERAPEUTIC EXERCISES: CPT | Mod: GP | Performed by: PHYSICAL THERAPIST

## 2021-06-10 PROCEDURE — 97035 APP MDLTY 1+ULTRASOUND EA 15: CPT | Mod: GP | Performed by: PHYSICAL THERAPIST

## 2021-06-14 ENCOUNTER — THERAPY VISIT (OUTPATIENT)
Dept: PHYSICAL THERAPY | Facility: CLINIC | Age: 41
End: 2021-06-14
Payer: COMMERCIAL

## 2021-06-14 DIAGNOSIS — M25.561 ACUTE PAIN OF RIGHT KNEE: ICD-10-CM

## 2021-06-14 PROCEDURE — 97110 THERAPEUTIC EXERCISES: CPT | Mod: GP | Performed by: PHYSICAL THERAPIST

## 2021-06-14 PROCEDURE — 97140 MANUAL THERAPY 1/> REGIONS: CPT | Mod: GP | Performed by: PHYSICAL THERAPIST

## 2021-06-21 ENCOUNTER — THERAPY VISIT (OUTPATIENT)
Dept: PHYSICAL THERAPY | Facility: CLINIC | Age: 41
End: 2021-06-21
Payer: COMMERCIAL

## 2021-06-21 DIAGNOSIS — M25.561 ACUTE PAIN OF RIGHT KNEE: ICD-10-CM

## 2021-06-21 PROCEDURE — 97110 THERAPEUTIC EXERCISES: CPT | Mod: GP | Performed by: PHYSICAL THERAPIST

## 2021-06-21 PROCEDURE — 97140 MANUAL THERAPY 1/> REGIONS: CPT | Mod: GP | Performed by: PHYSICAL THERAPIST

## 2021-07-01 ENCOUNTER — THERAPY VISIT (OUTPATIENT)
Dept: PHYSICAL THERAPY | Facility: CLINIC | Age: 41
End: 2021-07-01
Payer: COMMERCIAL

## 2021-07-01 DIAGNOSIS — M25.561 ACUTE PAIN OF RIGHT KNEE: ICD-10-CM

## 2021-07-01 PROCEDURE — 97140 MANUAL THERAPY 1/> REGIONS: CPT | Mod: GP | Performed by: PHYSICAL THERAPIST

## 2021-07-01 PROCEDURE — 97110 THERAPEUTIC EXERCISES: CPT | Mod: GP | Performed by: PHYSICAL THERAPIST

## 2021-07-15 ENCOUNTER — ANCILLARY PROCEDURE (OUTPATIENT)
Dept: GENERAL RADIOLOGY | Facility: CLINIC | Age: 41
End: 2021-07-15
Attending: PODIATRIST
Payer: COMMERCIAL

## 2021-07-15 ENCOUNTER — OFFICE VISIT (OUTPATIENT)
Dept: PODIATRY | Facility: CLINIC | Age: 41
End: 2021-07-15
Payer: COMMERCIAL

## 2021-07-15 VITALS
HEIGHT: 71 IN | WEIGHT: 185 LBS | HEART RATE: 74 BPM | DIASTOLIC BLOOD PRESSURE: 72 MMHG | SYSTOLIC BLOOD PRESSURE: 128 MMHG | BODY MASS INDEX: 25.9 KG/M2

## 2021-07-15 DIAGNOSIS — M77.8 CAPSULITIS OF FOOT, LEFT: ICD-10-CM

## 2021-07-15 DIAGNOSIS — M77.8 CAPSULITIS OF FOOT, LEFT: Primary | ICD-10-CM

## 2021-07-15 PROCEDURE — 73630 X-RAY EXAM OF FOOT: CPT | Mod: LT | Performed by: RADIOLOGY

## 2021-07-15 PROCEDURE — 99203 OFFICE O/P NEW LOW 30 MIN: CPT | Performed by: PODIATRIST

## 2021-07-15 ASSESSMENT — MIFFLIN-ST. JEOR: SCORE: 1771.28

## 2021-07-15 NOTE — PROGRESS NOTES
Subjective:    Pt is seen today as a new pt referral with the c/c of painful left foot.  This has been symptomatic for the past 1 years.  Pt denies any hx of trauma.  Aggravated by activity and releaved by rest.  Describes as burning pain.  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  He has been trying to run for exercise 1 mile before work.  When doing this to cause pain.  Has a history of bilateral lower extremity pain.  He is currently seeing physical therapy for this.  He denies edema ecchymosis weakness.  States occasionally hears some snapping.  He goes his cabin on weekends.  He is barefoot all the time.  He wears socks or slippers around the house.  We will screw him    ROS:  A 10-point review of systems was performed and is positive for that noted in the HPI and as seen above.  All other areas are negative.        No Known Allergies    Current Outpatient Medications   Medication Sig Dispense Refill     Ascorbic Acid (VITAMIN C PO)        Iodine, Kelp, (KELP PO)        magnesium 100 MG CAPS        Misc Natural Products (SUPER GRAPEFRUIT N FIBER DIET PO)        saccharomyces boulardii (FLORASTOR) 250 MG capsule Take 250 mg by mouth 2 times daily         Patient Active Problem List   Diagnosis     Inguinal hernia     Back pain     Hyperlipidemia LDL goal <160     Right knee pain       Past Medical History:   Diagnosis Date     Alcohol abuse, in remission        Past Surgical History:   Procedure Laterality Date     C ORAL SURGERY PROCEDURE      wisdom teeth  extraction     LAPAROSCOPIC HERNIORRHAPHY INGUINAL Right 10/02/2015    Procedure: LAPAROSCOPIC HERNIORRHAPHY INGUINAL;  Surgeon: Alexander Vaughan MD;  [facility]     TONSILLECTOMY & ADENOIDECTOMY         Family History   Problem Relation Age of Onset     Alcoholism Mother          from complications       Social History     Tobacco Use     Smoking status: Never Smoker     Smokeless tobacco: Never Used  "  Substance Use Topics     Alcohol use: No     Comment: quit 2010         Exam:    Vitals: /72   Pulse 74   Ht 1.803 m (5' 11\")   Wt 83.9 kg (185 lb)   BMI 25.80 kg/m    BMI: Body mass index is 25.8 kg/m .  Height: 5' 11\"    Constitutional/ general:  Pt is in no apparent distress, appears well-nourished.  Cooperative with history and physical exam.     Psych:  The patient answered questions appropriately.  Normal affect.  Seems to have reasonable expectations, in terms of treatment.     Eyes:  Visual scanning/ tracking without deficit.     Ears:  Response to auditory stimuli is normal.  negative hearing aid devices.  Auricles in proper alignment.     Lymphatic:  Popliteal lymph nodes not enlarged.     Lungs:  Non labored breathing, non labored speech. No cough.  No audible wheezing. Even, quiet breathing.       Vascular:  positive pedal pulses bilaterally for both the DP and PT arteries.  CFT < 3 sec.  negative ankle edema.  positive pedal hair growth.    Neuro:  Alert and oriented x 3. Coordinated gait.  Light touch sensation is intact to the L4, L5, S1 distributions. No obvious deficits.  No evidence of neurological-based weakness, spasticity, or contracture in the lower extremities.      Derm: Normal texture and turgor.  No erythema, ecchymosis, or cyanosis.      Musculoskeletal:    Lower extremity muscle strength is normal.  Patient is ambulatory without an assistive device or brace.  No gross deformities.   We note that on the right foot he has increased internal tibial torsion.  On the left he has increased external tibial torsion.  Somewhat pronated foot on right and the left has a normal to slightly high arch.  On the left foot there is no pain with stressing the peroneal tendons.  No pain on calcaneal cuboid joint.  No pain on the styloid process.  No pain on the first second third ray.  He has pain on palpation mostly in the fourth tarsometatarsal joint and to lesser extent on the fifth.  No " edema erythema or ecchymosis.    Radiographic Exam:  X-Ray Findings:  I personally reviewed the films.  Unremarkable    A:  left cavus foot with fourth tarsometatarsal joint capsulitis    Plan:  X-rays taken today of the left foot.  We discussed how the mechanics of his left lower extremity are different and puts more stress here.  Discussed the importance of wearing a good stiff shoe at all times both inside and outside and I made suggestions.  He states he has a stiff pair of shoes up with a cabinet will start wearing these all the time.  He will try an over-the-counter arch support.  We also discussed orthotics.  We discussed cost and how to get these.  He will call me if he would like a pair.  He will try to avoid activities that bother this until healed.  Will place referral for physical therapy.  RTC as needed.    Tino Zamudio DPM, FACFAS

## 2021-07-15 NOTE — LETTER
7/15/2021         RE: John Valiente  [address] [address]        Dear Colleague,    Thank you for referring your patient, John Valiente, to the [facility]. Please see a copy of my visit note below.    Subjective:    Pt is seen today as a new pt referral with the c/c of painful left foot.  This has been symptomatic for the past 1 years.  Pt denies any hx of trauma.  Aggravated by activity and releaved by rest.  Describes as burning pain.  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  He has been trying to run for exercise 1 mile before work.  When doing this to cause pain.  Has a history of bilateral lower extremity pain.  He is currently seeing physical therapy for this.  He denies edema ecchymosis weakness.  States occasionally hears some snapping.  He goes his cabin on weekends.  He is barefoot all the time.  He wears socks or slippers around the house.  We will screw him    ROS:  A 10-point review of systems was performed and is positive for that noted in the HPI and as seen above.  All other areas are negative.        No Known Allergies    Current Outpatient Medications   Medication Sig Dispense Refill     Ascorbic Acid (VITAMIN C PO)        Iodine, Kelp, (KELP PO)        magnesium 100 MG CAPS        Misc Natural Products (SUPER GRAPEFRUIT N FIBER DIET PO)        saccharomyces boulardii (FLORASTOR) 250 MG capsule Take 250 mg by mouth 2 times daily         Patient Active Problem List   Diagnosis     Inguinal hernia     Back pain     Hyperlipidemia LDL goal <160     Right knee pain       Past Medical History:   Diagnosis Date     Alcohol abuse, in remission        Past Surgical History:   Procedure Laterality Date     C ORAL SURGERY PROCEDURE      wisdom teeth  extraction     LAPAROSCOPIC HERNIORRHAPHY INGUINAL Right 10/02/2015    Procedure: LAPAROSCOPIC HERNIORRHAPHY INGUINAL;  Surgeon: Alexander Vaughan MD;   "[facility]     TONSILLECTOMY & ADENOIDECTOMY         Family History   Problem Relation Age of Onset     Alcoholism Mother          from complications       Social History     Tobacco Use     Smoking status: Never Smoker     Smokeless tobacco: Never Used   Substance Use Topics     Alcohol use: No     Comment: quit          Exam:    Vitals: /72   Pulse 74   Ht 1.803 m (5' 11\")   Wt 83.9 kg (185 lb)   BMI 25.80 kg/m    BMI: Body mass index is 25.8 kg/m .  Height: 5' 11\"    Constitutional/ general:  Pt is in no apparent distress, appears well-nourished.  Cooperative with history and physical exam.     Psych:  The patient answered questions appropriately.  Normal affect.  Seems to have reasonable expectations, in terms of treatment.     Eyes:  Visual scanning/ tracking without deficit.     Ears:  Response to auditory stimuli is normal.  negative hearing aid devices.  Auricles in proper alignment.     Lymphatic:  Popliteal lymph nodes not enlarged.     Lungs:  Non labored breathing, non labored speech. No cough.  No audible wheezing. Even, quiet breathing.       Vascular:  positive pedal pulses bilaterally for both the DP and PT arteries.  CFT < 3 sec.  negative ankle edema.  positive pedal hair growth.    Neuro:  Alert and oriented x 3. Coordinated gait.  Light touch sensation is intact to the L4, L5, S1 distributions. No obvious deficits.  No evidence of neurological-based weakness, spasticity, or contracture in the lower extremities.      Derm: Normal texture and turgor.  No erythema, ecchymosis, or cyanosis.      Musculoskeletal:    Lower extremity muscle strength is normal.  Patient is ambulatory without an assistive device or brace.  No gross deformities.   We note that on the right foot he has increased internal tibial torsion.  On the left he has increased external tibial torsion.  Somewhat pronated foot on right and the left has a normal to slightly high arch.  On the left foot there is no " pain with stressing the peroneal tendons.  No pain on calcaneal cuboid joint.  No pain on the styloid process.  No pain on the first second third ray.  He has pain on palpation mostly in the fourth tarsometatarsal joint and to lesser extent on the fifth.  No edema erythema or ecchymosis.    Radiographic Exam:  X-Ray Findings:  I personally reviewed the films.  Unremarkable    A:  left cavus foot with fourth tarsometatarsal joint capsulitis    Plan:  X-rays taken today of the left foot.  We discussed how the mechanics of his left lower extremity are different and puts more stress here.  Discussed the importance of wearing a good stiff shoe at all times both inside and outside and I made suggestions.  He states he has a stiff pair of shoes up with a cabinet will start wearing these all the time.  He will try an over-the-counter arch support.  We also discussed orthotics.  We discussed cost and how to get these.  He will call me if he would like a pair.  He will try to avoid activities that bother this until healed.  Will place referral for physical therapy.  RTC as needed.    Tino Zamudio DPM, FACFAS            Again, thank you for allowing me to participate in the care of your patient.        Sincerely,        Tino Zamudio DPM

## 2021-07-15 NOTE — PATIENT INSTRUCTIONS
We wish you continued good healing. If you have any questions or concerns, please do not hesitate to contact us at [phone]    "[employer]t (secure e-mail communication and access to your chart) to send a message or to make an appointment.    Please remember to call and schedule a follow up appointment if one was recommended at your earliest convenience.     +++OF MARCH 2020+++ LOCATION AND HOURS HAVE CHANGED    PLEASE CALL CLINICS TO VERIFY DAYS AND TIMES  PODIATRY CLINIC HOURS  TELEPHONE NUMBER    Dr. Tino LOPEZPPARMINDER [facility]        Clinics:  [place] [facility]   Tuesday 1PM-6PM  [place]  Wednesday 745AM-330PM  [place]  Thursday/Friday 745AM-230PM  [place] [facility] APPOINTMENTS  [phone]    [place] APPOINTMENTS  [phone]          If you need a medication refill, please contact us you may need lab work and/or a follow up visit prior to your refill (i.e. Antifungal medications).    If MRI needed please call Imaging at [phone] or [phone]    HOW DO I GET MY KNEE SCOOTER? Knee scooters can be picked up at ANY Medical Supply stores with your knee scooter Prescription.  OR    Bring your signed prescription to an [facility] showroom.

## 2021-08-09 ENCOUNTER — THERAPY VISIT (OUTPATIENT)
Dept: PHYSICAL THERAPY | Facility: CLINIC | Age: 41
End: 2021-08-09
Attending: PODIATRIST
Payer: COMMERCIAL

## 2021-08-09 DIAGNOSIS — M25.561 ACUTE PAIN OF RIGHT KNEE: ICD-10-CM

## 2021-08-09 DIAGNOSIS — M77.8 CAPSULITIS OF FOOT, LEFT: ICD-10-CM

## 2021-08-09 PROCEDURE — 97161 PT EVAL LOW COMPLEX 20 MIN: CPT | Mod: GP | Performed by: PHYSICAL THERAPIST

## 2021-08-09 PROCEDURE — 97140 MANUAL THERAPY 1/> REGIONS: CPT | Mod: GP | Performed by: PHYSICAL THERAPIST

## 2021-08-09 NOTE — LETTER
[facility]  [address]  [address]  [place] [address]  [phone]    August 10, 2021    Re: John Valiente   :   1980  MRN:  [id number]   REFERRING PHYSICIAN:   CARMEN [facility]  Date of Initial Evaluation:  2021  Visits:  Rxs Used: 6  Reason for Referral:     Acute pain of right knee  Capsulitis of foot, left    EVALUATION SUMMARY    Physical Therapy Initial Evaluation  Subjective:  The history is provided by the patient.   Patient Health History  John Valiente being seen for pt reports hes had left foot pain for years, recent resumption o f.     Problem began: 7/15/2021.   Problem occurred: kicked a dresser    Pain is reported as 8/10 on pain scale.  General health as reported by patient is good.  Pertinent medical history includes: none.       Current medications:  Pain medication.    Current occupation is contractor .   Primary job tasks include:  Pushing/pulling, repetitive tasks, operating a machine/assembly and lifting/carrying.                Therapist Generated HPI Evaluation  Problem details: Pt indicates a chronic pain at his left 4-5th intra metatarsal area, at the distal end. He describes pain that is constant and worsened w/ time on his feet.  .         Type of problem:  Left foot.    This is a chronic condition.  Condition occurred with:  Other reason.  Where condition occurred: at home.  Patient reports pain:  Metatarsal heads.  Pain is described as aching and stabbing and is constant.    Since onset symptoms are unchanged.  Symptoms are exacerbated by running, standing and walking  and relieved by nothing.  Special tests included:  X-ray.    Restrictions due to condition include:  Working in normal job without restrictions.  Barriers include:  None as reported by patient.              Objective:  Ankle/Foot Evaluation  ROM:  AROM is normal.PROM is normal.  Strength is  normal.    LIGAMENT TESTING: Ligament testing ankle: 5th MT is hyper mobile vs Rt, STalus jt is hypomobile vs Rt      PALPATION: Palpation of ankle: intermet 4-5th heads   FUNCTIONAL TESTS: Functional test ankle: normal gait in heavy boots     Assessment/Plan:    Patient is a 41 year old male with left foot/met heads/5ht mT complaints.    Patient has the following significant findings with corresponding treatment plan.                Diagnosis 1:  Left foot pain   Pain -  hot/cold therapy, US and manual therapy  Impaired muscle performance - neuro re-education and home program  Decreased function - therapeutic activities  Instability -  Therapeutic Activity  Therapeutic Exercise    Therapy Evaluation Codes:   1) History comprised of:   Personal factors that impact the plan of care:      None.    Comorbidity factors that impact the plan of care are:      None.     Medications impacting care: Anti-depressant and Pain.  2) Examination of Body Systems comprised of:   Body structures and functions that impact the plan of care:      foot .   Activity limitations that impact the plan of care are:      Running, Sports and Walking.  3) Clinical presentation characteristics are:   Stable/Uncomplicated.  4) Decision-Making    Low complexity using standardized patient assessment instrument and/or measureable assessment of functional outcome.  Cumulative Therapy Evaluation is: Low complexity.    Previous and current functional limitations:  (See Goal Flow Sheet for this information)    Short term and Long term goals: (See Goal Flow Sheet for this information)     Communication ability:  Patient appears to be able to clearly communicate and understand verbal and written communication and follow directions correctly.  Treatment Explanation - The following has been discussed with the patient:   RX ordered/plan of care  Anticipated outcomes  Possible risks and side effects  This patient would benefit from PT intervention to resume  normal activities.   Rehab potential is good.    Re: John LYLES [place]   :   1980    Frequency:  1 X week, once daily every other wk   Duration:  for 8 weeks  Discharge Plan:  Achieve all LTG.  Independent in home treatment program.  Reach maximal therapeutic benefit.    Please refer to the daily flowsheet for treatment today, total treatment time and time spent performing 1:1 timed codes.           Thank you for your referral.    INQUIRIES  Therapist: Efrain Brooks PT   [address] [address]  Phone: [phone]  Fax: [phone]

## 2021-08-19 ENCOUNTER — THERAPY VISIT (OUTPATIENT)
Dept: PHYSICAL THERAPY | Facility: CLINIC | Age: 41
End: 2021-08-19
Payer: COMMERCIAL

## 2021-08-19 DIAGNOSIS — M25.561 ACUTE PAIN OF RIGHT KNEE: ICD-10-CM

## 2021-08-19 PROCEDURE — 97110 THERAPEUTIC EXERCISES: CPT | Mod: GP | Performed by: PHYSICAL THERAPIST

## 2021-09-20 ENCOUNTER — THERAPY VISIT (OUTPATIENT)
Dept: PHYSICAL THERAPY | Facility: CLINIC | Age: 41
End: 2021-09-20
Payer: COMMERCIAL

## 2021-09-20 DIAGNOSIS — M25.561 ACUTE PAIN OF RIGHT KNEE: ICD-10-CM

## 2021-09-20 PROCEDURE — 97110 THERAPEUTIC EXERCISES: CPT | Mod: GP | Performed by: PHYSICAL THERAPIST

## 2021-10-13 ENCOUNTER — THERAPY VISIT (OUTPATIENT)
Dept: PHYSICAL THERAPY | Facility: CLINIC | Age: 41
End: 2021-10-13
Payer: COMMERCIAL

## 2021-10-13 DIAGNOSIS — M25.561 ACUTE PAIN OF RIGHT KNEE: ICD-10-CM

## 2021-10-13 PROCEDURE — 97110 THERAPEUTIC EXERCISES: CPT | Mod: GP | Performed by: PHYSICAL THERAPIST

## 2021-10-13 PROCEDURE — 97140 MANUAL THERAPY 1/> REGIONS: CPT | Mod: GP | Performed by: PHYSICAL THERAPIST

## 2022-03-02 ENCOUNTER — TELEPHONE (OUTPATIENT)
Dept: UROLOGY | Facility: CLINIC | Age: 42
End: 2022-03-02
Payer: COMMERCIAL

## 2022-03-02 NOTE — TELEPHONE ENCOUNTER
[facility] Call Center    Phone Message    May a detailed message be left on voicemail: yes     Reason for Call: Patients wife calling wanting to ask a few questions about vasectomy procedures we offer. Wondering about a No Scalpel No Needle, open ended procedure. Please reach out to Michela at number provided, thank you!    Action Taken: Message routed to:  [facility]    Travel Screening: Not Applicable

## 2022-03-03 ENCOUNTER — TELEPHONE (OUTPATIENT)
Dept: FAMILY MEDICINE | Facility: CLINIC | Age: 42
End: 2022-03-03
Payer: COMMERCIAL

## 2022-03-03 NOTE — TELEPHONE ENCOUNTER
Spoke to pt's wife. Discussed scheduling a consult to discuss vasectomy. She agrees to schedule. Assisted to schedule appointment.     Nell Bronson RN MSN

## 2022-03-03 NOTE — TELEPHONE ENCOUNTER
Attempted to call Michela. Left detailed message to call clinic back at [phone].   Calling to inform her that the procedure does involve scalpel and needles.     Nell Bronson RN MSN

## 2022-03-03 NOTE — TELEPHONE ENCOUNTER
Spoke to pt's wife and informed her of procedure includes scapel and needles. She verbalized understanding. Assisted to schedule a consult.     eNll Bronson RN MSN

## 2022-03-03 NOTE — TELEPHONE ENCOUNTER
Reason for Call: Request for an order or referral:    Order or referral being requested: referral    Date needed:     Has the patient been seen by the PCP for this problem? NO    Additional comments: patients wife is calling asking for a referral for  to have a vasectomy done. I explained to her that her  is not been seen by Dr House since 2015 and that he is assigned to a different clinic and provider and that she should be calling them. She stated that Dr House has been his doctor for years but her  just does not come in very often.     Phone number Patient can be reached at:  Cell number on file:    Telephone Information:   Mobile [phone]        Best Time:      Can we leave a detailed message on this number?  YES    Call taken on 3/3/2022 at 10:45 AM by Jelly Reid

## 2022-03-04 NOTE — TELEPHONE ENCOUNTER
MEDICAL RECORDS REQUEST   [facility]  Urology Clinic  [address] [place] [address]  PHONE: [phone]  Fax: [phone]        FUTURE VISIT INFORMATION                                                   John Valiente, : 1980 scheduled for future visit at [facility]    APPOINTMENT INFORMATION:    Date: 22    Provider:  Lv Saravia MD    Reason for Visit/Diagnosis: vas consult    REFERRAL INFORMATION:    Referring provider:      Specialty:     Referring providers clinic:      Clinic contact number:      RECORDS REQUESTED FOR VISIT                                                     NOTES  STATUS/DETAILS   OFFICE NOTE from referring provider  no   OFFICE NOTE from other specialist  no   DISCHARGE SUMMARY from hospital  no   DISCHARGE REPORT from the ER  no   OPERATIVE REPORT  no   MEDICATION LIST  yes   LABS     URINALYSIS (UA)  no   URINE CYTOLOGY  no     PRE-VISIT CHECKLIST      Record collection complete Yes   Appointment appropriately scheduled           (right time/right provider) Yes   Joint diagnostic appointment coordinated correctly          (ensure right order & amount of time) N/A   MyChart activation No   Questionnaire complete If no, please explain

## 2022-03-28 ENCOUNTER — VIRTUAL VISIT (OUTPATIENT)
Dept: SLEEP MEDICINE | Facility: CLINIC | Age: 42
End: 2022-03-28
Payer: COMMERCIAL

## 2022-03-28 VITALS — HEIGHT: 71 IN | BODY MASS INDEX: 28.7 KG/M2 | WEIGHT: 205 LBS

## 2022-03-28 DIAGNOSIS — G47.50 PARASOMNIA, UNSPECIFIED TYPE: ICD-10-CM

## 2022-03-28 DIAGNOSIS — R06.83 SNORING: Primary | ICD-10-CM

## 2022-03-28 DIAGNOSIS — R35.1 NOCTURIA: ICD-10-CM

## 2022-03-28 PROCEDURE — 99204 OFFICE O/P NEW MOD 45 MIN: CPT | Mod: 95 | Performed by: PHYSICIAN ASSISTANT

## 2022-03-28 ASSESSMENT — SLEEP AND FATIGUE QUESTIONNAIRES
HOW LIKELY ARE YOU TO NOD OFF OR FALL ASLEEP WHILE SITTING AND TALKING TO SOMEONE: WOULD NEVER DOZE
HOW LIKELY ARE YOU TO NOD OFF OR FALL ASLEEP WHILE SITTING QUIETLY AFTER LUNCH WITHOUT ALCOHOL: WOULD NEVER DOZE
HOW LIKELY ARE YOU TO NOD OFF OR FALL ASLEEP WHILE SITTING AND READING: SLIGHT CHANCE OF DOZING
HOW LIKELY ARE YOU TO NOD OFF OR FALL ASLEEP WHILE LYING DOWN TO REST IN THE AFTERNOON WHEN CIRCUMSTANCES PERMIT: HIGH CHANCE OF DOZING
HOW LIKELY ARE YOU TO NOD OFF OR FALL ASLEEP WHILE WATCHING TV: HIGH CHANCE OF DOZING
HOW LIKELY ARE YOU TO NOD OFF OR FALL ASLEEP IN A CAR, WHILE STOPPED FOR A FEW MINUTES IN TRAFFIC: WOULD NEVER DOZE
HOW LIKELY ARE YOU TO NOD OFF OR FALL ASLEEP WHILE SITTING INACTIVE IN A PUBLIC PLACE: WOULD NEVER DOZE
HOW LIKELY ARE YOU TO NOD OFF OR FALL ASLEEP WHEN YOU ARE A PASSENGER IN A CAR FOR AN HOUR WITHOUT A BREAK: WOULD NEVER DOZE

## 2022-03-28 ASSESSMENT — PAIN SCALES - GENERAL: PAINLEVEL: NO PAIN (0)

## 2022-03-28 NOTE — PROGRESS NOTES
"John is a 41 year old who is being evaluated via a billable video visit.      How would you like to obtain your AVS? MyChart  If the video visit is dropped, the invitation should be resent by: Text to cell phone: [phone]  Will anyone else be joining your video visit? Wife, Michela Chadwick    Video-Visit Details    Type of service:  Video Visit    Video Start Time: 9:37AM    Video End Time:10:15AM    Originating Location (pt. Location): Home    Distant Location (provider location):  [facility]     Platform used for Video Visit: [facility]   Outpatient Sleep Medicine Consultation  Mar 28, 2022       Name: John Valiente MRN# [id number]   Age: 41 year old YOB: 1980     Date of Consultation: Mar 28, 2022   Consultation is requested by: No referring provider defined for this encounter. No ref. provider found  Primary care provider: Ady Scott         Assessment and Plan:   1. Snoring  2. Nocturia  3. Parasomnia, unspecified type    Patient is being evaluated for Obstructive Sleep Apnea (EMIGDIO). Patient's risk factors for EMIGDIO include: snoring, gasp arousal, frequent nocturia, and male gender. We discussed pathophysiology of EMIGDIO and testing with overnight attended polysomnography versus home sleep apnea testing. An order was placed today for in lab polysomnography today as HST is inappropriate for patient given report of possible parasomnia - describes \"flailing\" his arms in sleep and concern for possible dream enactment, also unclear sleepwalking/eating history.  - Comprehensive Sleep Study; Future    Briefly discussed potential treatment modalities (CPAP and oral appliance) in the event sleep apnea is identified on testing and additional information given in patient instructions. Will plan to discuss in more detail at next visit pending study results.     Follow up 1-2 weeks following his study for review of " "results and to expedite care. Educational materials provided in instructions.       Chief Complaint / Reason for Sleep Consult:     Chief Complaint   Patient presents with     Video Visit     sleep study          History of Present Illness:     John Valiente is a 41 year old male who presents to the clinic for evaluation of snoring and concern of sleep apnea. Wife complains of snoring. Wife also concerned of his frequent nighttime urination, typically up 2-3 times per night. No witnessed apneas. Does admit to gasp arousals and \"startling\" himself awake. Sleeps on right side primarily. Reports intermittent/rare nocturnal GERD. Denies morning headaches.  Does report morning dry mouth, known mouth breather at night.      Sleep schedule: On weekdays, goes to bed at 10-10:30PM and wakes 5-6:00AM. On weekends, goes to bed at 12:00AM and wakes 7:30-8:00AM. Falls asleep in \"no time at all\". Wakes 2-3 times per night for restroom, returns to sleep again very quickly.     No daytime napping. Denies any history of falling asleep or dozing while driving. No accidents or near accidents. Patient was counseled on the importance of driving while alert. He had a total Sobieski Sleepiness Scale of 7 (03/28/22 0923), with scores of 10 or higher indicating abnormal levels of sleepiness.    Reports sleep walking and sleep eating but appears to be knowing he is doing so - \"I'm not sure if it's sleepwalking really because a lot of times I am like half asleep but out of it enough that I forget my nutritioanl diet objectives and it's a half way space where I'll be half asleep and go downstairs and eat half a bag of chips but I'm kind of awake but other times I forget that I did it and I'm not really totally unaware but I'm really stupid\". Estimates he can do this about 3 days per week.     Unclear/possible dream enactment - does not ever recall being correlated with dreaming but can \"flop over and flail\" his arms, \"just earlier this week " "I smacked her in the middle of the night with my arm\". Could not elaborate further.    Patient denies typical restless legs syndrome symptoms. No leg movements/kicking. 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 Denies bruxism but \"I clench sometimes\".     SCALES       SLEEP APNEA: Stopbang score  2-3/8       INSOMNIA:  Insomnia severity score: 12/28   absence of insomnia (0-7); sub-threshold insomnia (8-14); moderate insomnia (15-21); and severe insomnia (22-28)       SLEEPINESS: [place] sleepiness scale: 7/24 [normal < 11]          Medications:     Current Outpatient Medications   Medication Sig     Ascorbic Acid (VITAMIN C PO)      Iodine, Kelp, (KELP PO)      magnesium 100 MG CAPS      Misc Natural Products (SUPER GRAPEFRUIT N FIBER DIET PO)      saccharomyces boulardii (FLORASTOR) 250 MG capsule Take 250 mg by mouth 2 times daily     No current facility-administered medications for this visit.             Allergies:     No Known Allergies         Past Medical History:     Past Medical History:   Diagnosis Date     Alcohol abuse, in remission            Past Surgical History:    Previous upper airway surgery: T&A in childhood   Past Surgical History:   Procedure Laterality Date     LAPAROSCOPIC HERNIORRHAPHY INGUINAL Right 10/02/2015    Procedure: LAPAROSCOPIC HERNIORRHAPHY INGUINAL;  Surgeon: Alexander Vaughan MD;  Location:  OR     TONSILLECTOMY & ADENOIDECTOMY       [facility] ORAL SURGERY PROCEDURE      wisdom teeth  extraction          Social History:     Social History     Tobacco Use     Smoking status: Current Some Day Smoker     Types: Pipe     Smokeless tobacco: Never Used   Substance Use Topics     Alcohol use: No     Comment: quit 2010     Chemical History:  Alcohol use: Denies       Tobacco use: Tobacco pipe once a week to once a month   Illicit substances: Marijuana daily   Caffeine intake: Drinks 2 large Americano's per day. Last caffeine intake is usually before 12:00PM         Family History: " "    Family History   Problem Relation Age of Onset     Alcoholism Mother          from complications      Sleep Family Hx: Brother with EMIGDIO in childhood. Patient denies any known family history of restless legs syndrome, narcolepsy or other sleep disorders.          Physical Examination:   Ht 1.803 m (5' 11\")   Wt 93 kg (205 lb)   BMI 28.59 kg/m    General appearance: Awake, alert, cooperative. Well groomed. Sitting comfortably in chair. In no apparent distress.  HEENT: Head: Normocephalic, atraumatic. Eyes:Conjunctiva clear. Sclera normal. Nose: External appearance without deformity.   Pulmonary:  Able to speak easily in full sentences. No cough or wheeze.   Skin:  No rashes or significant lesions on visible skin.   Neurologic: Alert, oriented x3.   Psychiatric: Mood euthymic. Affect congruent with full range and intensity.          Data: All pertinent previous laboratory data reviewed     No results found for: PH, PHARTERIAL, PO2, HH2IBNMEAVY, SAT, PCO2, HCO3, BASEEXCESS, BRANNON, BEB  No results found for: TSH  No results found for: GLC  No results found for: HGB  No results found for: BUN, CR  Lab Results   Component Value Date    AST 32 2018    ALT 69 2018    ALKPHOS 73 2018    BILITOTAL 0.6 2018     No results found for: UAMP, UBARB, BENZODIAZEUR, UCANN, UCOC, OPIT, UPCP    Copy to: Ady Scott PA-C  Mar 28, 2022     [facility]  [address] [place] [id number] [place] [address] [facility] Center  [address] [place] [id number]    Chart documentation was completed, in part, with [employer] 911 voice-recognition software. Even though reviewed, some grammatical, spelling, and word errors may remain.    58 minutes spent on day of encounter doing chart review, history and exam, documentation, and further activities as noted above    "

## 2022-03-28 NOTE — PATIENT INSTRUCTIONS
"      MY TREATMENT INFORMATION FOR SLEEP APNEA-  John LYLES Elida    Am I having a sleep study at a sleep center?  --->Due to normal delays, you will be contacted within 2-4 weeks to schedule    Frequently asked questions:  1. What is Obstructive Sleep Apnea (EMIGDIO)? EMIGDIO is the most common type of sleep apnea. Apnea means, \"without breath.\"  Apnea is most often caused by narrowing or collapse of the upper airway as muscles relax during sleep.   Almost everyone has occasional apneas. Most people with sleep apnea have had brief interruptions at night frequently for many years.  The severity of sleep apnea is related to how frequent and severe the events are.   2. What are the consequences of EMIGDIO? Symptoms include: feeling sleepy during the day, snoring loudly, gasping or stopping of breathing, trouble sleeping, and occasionally morning headaches or heartburn at night.  Sleepiness can be serious and even increase the risk of falling asleep while driving. Other health consequences may include development of high blood pressure and other cardiovascular disease in persons who are susceptible. Untreated EMIGDIO  can contribute to heart disease, stroke and diabetes.   3. What are the treatment options? In most situations, sleep apnea is a lifelong disease that must be managed with daily therapy. Medications are not effective for sleep apnea and surgery is generally not considered until other therapies have been tried. Your treatment is your choice . Continuous Positive Airway (CPAP) works right away and is the therapy that is effective in nearly everyone. An oral device to hold your jaw forward is usually the next most reliable option. Other options include postioning devices (to keep you off your back), weight loss, and surgery including a tongue pacing device. There is more detail about some of these options below.  4. Are my sleep studies covered by insurance? Although we will request verification of coverage, we advise you " also check in advance of the study to ensure there is coverage.    Important tips for those choosing CPAP and similar devices   Know your equipment:  CPAP is continuous positive airway pressure that prevents obstructive sleep apnea by keeping the throat from collapsing while you are sleeping. In most cases, the device is  smart  and can slowly self-adjusts if your throat collapses and keeps a record every day of how well you are treated-this information is available to you and your care team.  BPAP is bilevel positive airway pressure that keeps your throat open and also assists each breath with a pressure boost to maintain adequate breathing.  Special kinds of BPAP are used in patients who have inadequate breathing from lung or heart disease. In most cases, the device is  smart  and can slowly self-adjusts to assist breathing. Like CPAP, the device keeps a record of how well you are treated.  Your mask is your connection to the device. You get to choose what feels most comfortable and the staff will help to make sure if fits. Here: are some examples of the different masks that are available:       Key points to remember on your journey with sleep apnea:  1. Sleep study.  PAP devices often need to be adjusted during a sleep study to show that they are effective and adjusted right.  2. Good tips to remember: Try wearing just the mask during a quiet time during the day so your body adapts to wearing it. A humidifier is recommended for comfort in most cases to prevent drying of your nose and throat. Allergy medication from your provider may help you if you are having nasal congestion.  3. Getting settled-in. It takes more than one night for most of us to get used to wearing a mask. Try wearing just the mask during a quiet time during the day so your body adapts to wearing it. A humidifier is recommended for comfort in most cases. Our team will work with you carefully on the first day and will be in contact within 4 days  and again at 2 and 4 weeks for advice and remote device adjustments. Your therapy is evaluated by the device each day.   4. Use it every night. The more you are able to sleep naturally for 7-8 hours, the more likely you will have good sleep and to prevent health risks or symptoms from sleep apnea. Even if you use it 4 hours it helps. Occasionally all of us are unable to use a medical therapy, in sleep apnea, it is not dangerous to miss one night.   5. Communicate. Call our skilled team on the number provided on the first day if your visit for problems that make it difficult to wear the device. Over 2 out of 3 patients can learn to wear the device long-term with help from our team. Remember to call our team or your sleep providers if you are unable to wear the device as we may have other solutions for those who cannot adapt to mask CPAP therapy. It is recommended that you sleep your sleep provider within the first 3 months and yearly after that if you are not having problems.   6. Use it for your health. We encourage use of CPAP masks during daytime quiet periods to allow your face and brain to adapt to the sensation of CPAP so that it will be a more natural sensation to awaken to at night or during naps. This can be very useful during the first few weeks or months of adapting to CPAP though it does not help medically to wear CPAP during wakefulness and  should not be used as a strategy just to meet guidelines.  7. Take care of your equipment. Make sure you clean your mask and tubing using directions every day and that your filter and mask are replaced as recommended or if they are not working.     BESIDES CPAP, WHAT OTHER THERAPIES ARE THERE?    Positioning Device  Positioning devices are generally used when sleep apnea is mild and only occurs on your back.This example shows a pillow that straps around the waist. It may be appropriate for those whose sleep study shows milder sleep apnea that occurs primarily when  lying flat on one's back. Preliminary studies have shown benefit but effectiveness at home may need to be verified by a home sleep test. These devices are generally not covered by medical insurance.  Examples of devices that maintain sleeping on the back to prevent snoring and mild sleep apnea.    Belt type body positioner  http://[employer].com/    Electronic reminder  http://nightshifttherapy.com/  http://www.[employer].au/      Oral Appliance  What is oral appliance therapy?  An oral appliance device fits on your teeth at night like a retainer used after having braces. The device is made by a specialized dentist and requires several visits over 1-2 months before a manufactured device is made to fit your teeth and is adjusted to prevent your sleep apnea. Once an oral device is working properly, snoring should be improved. A home sleep test may be recommended at that time if to determine whether the sleep apnea is adequately treated.       Some things to remember:  -Oral devices are often, but not always, covered by your medical insurance. Be sure to check with your insurance provider.   -If you are referred for oral therapy, you will be given a list of specialized dentists to consider or you may choose to visit the Web site of the American Academy of Dental Sleep Medicine  -Oral devices are less likely to work if you have severe sleep apnea or are extremely overweight.     More detailed information  An oral appliance is a small acrylic device that fits over the upper and lower teeth  (similar to a retainer or a mouth guard). This device slightly moves jaw forward, which moves the base of the tongue forward, opens the airway, improves breathing for effective treat snoring and obstructive sleep apnea in perhaps 7 out of 10 people .  The best working devices are custom-made by a dental device  after a mold is made of the teeth 1, 2, 3.  When is an oral appliance indicated?  Oral appliance therapy is  recommended as a first-line treatment for patients with primary snoring, mild sleep apnea, and for patients with moderate sleep apnea who prefer appliance therapy to use of CPAP4, 5. Severity of sleep apnea is determined by sleep testing and is based on the number of respiratory events per hour of sleep.   How successful is oral appliance therapy?  The success rate of oral appliance therapy in patients with mild sleep apnea is 75-80% while in patients with moderate sleep apnea it is 50-70%. The chance of success in patients with severe sleep apnea is 40-50%. The research also shows that oral appliances have a beneficial effect on the cardiovascular health of EMIGDIO patients at the same magnitude as CPAP therapy7.  Oral appliances should be a second-line treatment in cases of severe sleep apnea, but if not completely successful then a combination therapy utilizing CPAP plus oral appliance therapy may be effective. Oral appliances tend to be effective in a broad range of patients although studies show that the patients who have the highest success are females, younger patients, those with milder disease, and less severe obesity. 3, 6.   Finding a dentist that practices dental sleep medicine  Specific training is available through the American Academy of Dental Sleep Medicine for dentists interested in working in the field of sleep. To find a dentist who is educated in the field of sleep and the use of oral appliances, near you, visit the Web site of the American Academy of Dental Sleep Medicine.    References  1. Jannette et al. Objectively measured vs self-reported compliance during oral appliance therapy for sleep-disordered breathing. Chest 2013; 144(5): 6567-5884.  2. Suzi et al. Objective measurement of compliance during oral appliance therapy for sleep-disordered breathing. Thorax 2013; 68(1): 91-96.  3. Jaz et al. Mandibular advancement devices in 620 men and women with EMIGDIO and snoring:  tolerability and predictors of treatment success. Chest 2004; 125: 0519-9995.  4. Haroldo et al. Oral appliances for snoring and EMIGDIO: a review. Sleep 2006; 29: 244-262.  5. Johanna et al. Oral appliance treatment for EMIGDIO: an update. J Clin Sleep Med 2014; 10(2): 215-227.  6. Andrews et al. Predictors of OSAH treatment outcome. J Dent Res 2007; 86: 6780-8699.      Weight Loss:    Weight loss is a long-term strategy that may improve sleep apnea in some patients.    Weight management is a personal decision and the decision should be based on your interest and the potential benefits.  If you are interested in exploring weight loss strategies, the following discussion covers the impact on weight loss on sleep apnea and the approaches that may be successful.    Being overweight does not necessarily mean you will have health consequences.  Those who have BMI over 35 or over 27 with existing medical conditions carries greater risk.   Weight loss decreases severity of sleep apnea in most people with obesity. For those with mild obesity who have developed snoring with weight gain, even 15-30 pound weight loss can improve and occasionally eliminate sleep apnea.  Structured and life-long dietary and health habits are necessary to lose weight and keep healthier weight levels.     Though there may be significant health benefits from weight loss, long-term weight loss is very difficult to achieve- studies show success with dietary management in less than 10% of people. In addition, substantial weight loss may require years of dietary control and may be difficult if patients have severe obesity. In these cases, surgical management may be considered.  Finally, older individuals who have tolerated obesity without health complications may be less likely to benefit from weight loss strategies.        Your BMI is Body mass index is 28.59 kg/m .  Weight management is a personal decision.  If you are interested in exploring weight  loss strategies, the following discussion covers the approaches that may be successful. Body mass index (BMI) is one way to tell whether you are at a healthy weight, overweight, or obese. It measures your weight in relation to your height.  A BMI of 18.5 to 24.9 is in the healthy range. A person with a BMI of 25 to 29.9 is considered overweight, and someone with a BMI of 30 or greater is considered obese. More than two-thirds of American adults are considered overweight or obese.  Being overweight or obese increases the risk for further weight gain. Excess weight may lead to heart disease and diabetes.  Creating and following plans for healthy eating and physical activity may help you improve your health.  Weight control is part of healthy lifestyle and includes exercise, emotional health, and healthy eating habits. Careful eating habits lifelong are the mainstay of weight control. Though there are significant health benefits from weight loss, long-term weight loss with diet alone may be very difficult to achieve- studies show long-term success with dietary management in less than 10% of people. Attaining a healthy weight may be especially difficult to achieve in those with severe obesity. In some cases, medications, devices and surgical management might be considered.  What can you do?  If you are overweight or obese and are interested in methods for weight loss, you should discuss this with your provider.     Consider reducing daily calorie intake by 500 calories.     Keep a food journal.     Avoiding skipping meals, consider cutting portions instead.    Diet combined with exercise helps maintain muscle while optimizing fat loss. Strength training is particularly important for building and maintaining muscle mass. Exercise helps reduce stress, increase energy, and improves fitness. Increasing exercise without diet control, however, may not burn enough calories to loose weight.       Start walking three days a week  10-20 minutes at a time    Work towards walking thirty minutes five days a week     Eventually, increase the speed of your walking for 1-2 minutes at time    And look into health and wellness programs that may be available through your health insurance provider, employer, local community center, or luis club.    Weight management plan: Patient was referred to their PCP to discuss a diet and exercise plan.      Surgery:    Surgery for obstructive sleep apnea is considered generally only when other therapies fail to work. Surgery may be discussed with you if you are having a difficult time tolerating CPAP and or when there is an abnormal structure that requires surgical correction.  Nose and throat surgeries often enlarge the airway to prevent collapse.  Most of these surgeries create pain for 1-2 weeks and up to half of the most common surgeries are not effective throughout life.  You should carefully discuss the benefits and drawbacks to surgery with your sleep provider and surgeon to determine if it is the best solution for you.   More information  Surgery for EMIGDIO is directed at areas that are responsible for narrowing or complete obstruction of the airway during sleep.  There are a wide range of procedures available to enlarge and/or stabilize the airway to prevent blockage of breathing in the three major areas where it can occur: the palate, tongue, and nasal regions.  Successful surgical treatment depends on the accurate identification of the factors responsible for obstructive sleep apnea in each person.  A personalized approach is required because there is no single treatment that works well for everyone.  Because of anatomic variation, consultation with an examination by a sleep surgeon is a critical first step in determining what surgical options are best for each patient.  In some cases, examination during sedation may be recommended in order to guide the selection of procedures.  Patients will be  counseled about risks and benefits as well as the typical recovery course after surgery. Surgery is typically not a cure for a person s EMIGDIO.  However, surgery will often significantly improve one s EMIGDIO severity (termed  success rate ).  Even in the absence of a cure, surgery will decrease the cardiovascular risk associated with OSA7; improve overall quality of life8 (sleepiness, functionality, sleep quality, etc).      Palate Procedures:  Patients with EMIGDIO often have narrowing of their airway in the region of their tonsils and uvula.  The goals of palate procedures are to widen the airway in this region as well as to help the tissues resist collapse.  Modern palate procedure techniques focus on tissue conservation and soft tissue rearrangement, rather than tissue removal.  Often the uvula is preserved in this procedure. Residual sleep apnea is common in patient after pharyngoplasty with an average reduction in sleep apnea events of 33%2.      Tongue Procedures:  ExamWhile patients are awake, the muscles that surround the throat are active and keep this region open for breathing. These muscles relax during sleep, allowing the tongue and other structures to collapse and block breathing.  There are several different tongue procedures available.  Selection of a tongue base procedure depends on characteristics seen on physical exam.  Generally, procedures are aimed at removing bulky tissues in this area or preventing the back of the tongue from falling back during sleep.  Success rates for tongue surgery range from 50-62%3.    Hypoglossal Nerve Stimulation:  Hypoglossal nerve stimulation has recently received approval from the [place] Food and Drug Administration for the treatment of obstructive sleep apnea.  This is based on research showing that the system was safe and effective in treating sleep apnea6.  Results showed that the median AHI score decreased 68%, from 29.3 to 9.0. This therapy uses an implant system  that senses breathing patterns and delivers mild stimulation to airway muscles, which keeps the airway open during sleep.  The system consists of three fully implanted components: a small generator (similar in size to a pacemaker), a breathing sensor, and a stimulation lead.  Using a small handheld remote, a patient turns the therapy on before bed and off upon awakening.    Candidates for this device must be greater than 22 years of age, have moderate to severe EMIGDIO (AHI between 20-65), BMI less than 32, have tried CPAP/oral appliance without success, and have appropriate upper airway anatomy (determined by a sleep endoscopy performed by Dr. Josue).    Hypoglossal Nerve Stimulation Pathway:    The sleep surgeon s office will work with the patient through the insurance prior-authorization process (including communications and appeals).    Nasal Procedures:  Nasal obstruction can interfere with nasal breathing during the day and night.  Studies have shown that relief of nasal obstruction can improve the ability of some patients to tolerate positive airway pressure therapy for obstructive sleep apnea1.  Treatment options include medications such as nasal saline, topical corticosteroid and antihistamine sprays, and oral medications such as antihistamines or decongestants. Non-surgical treatments can include external nasal dilators for selected patients. If these are not successful by themselves, surgery can improve the nasal airway either alone or in combination with these other options.      Combination Procedures:  Combination of surgical procedures and other treatments may be recommended, particularly if patients have more than one area of narrowing or persistent positional disease.  The success rate of combination surgery ranges from 66-80%2,3.    References  1. Jevon DAVIS. The Role of the Nose in Snoring and Obstructive Sleep Apnoea: An Update.  Eur Arch Otorhinolaryngol. 2011; 268: 1365-73.  2.  Jose ANDRADE; Dee  JA; Irena JR; Pallanch JF; Shane MB; Nilda SG; Allan WOLFF. Surgical modifications of the upper airway for obstructive sleep apnea in adults: a systematic review and meta-analysis. SLEEP 2010;33(10):5412-3070. Nehemias MATOS. Hypopharyngeal surgery in obstructive sleep apnea: an evidence-based medicine review.  Arch Otolaryngol Head Neck Surg. 2006 Feb;132(2):206-13.  3. Luiz YH1, Keenan Y, Zen CATHRYN. The efficacy of anatomically based multilevel surgery for obstructive sleep apnea. Otolaryngol Head Neck Surg. 2003 Oct;129(4):327-35.  4. Nehemias MATOS, Goldberg A. Hypopharyngeal Surgery in Obstructive Sleep Apnea: An Evidence-Based Medicine Review. Arch Otolaryngol Head Neck Surg. 2006 Feb;132(2):206-13.  5. Farhan HOBSON et al. Upper-Airway Stimulation for Obstructive Sleep Apnea.  N Engl J Med. 2014 Jan 9;370(2):139-49.  6. Pamela Y et al. Increased Incidence of Cardiovascular Disease in Middle-aged Men with Obstructive Sleep Apnea. Am J Respir Crit Care Med; 2002 166: 159-165  7. Guzman EM et al. Studying Life Effects and Effectiveness of Palatopharyngoplasty (SLEEP) study: Subjective Outcomes of Isolated Uvulopalatopharyngoplasty. Otolaryngol Head Neck Surg. 2011; 144: 623-631.        WHAT IF I ONLY HAVE SNORING?      Mandibular advancement devices, lateral sleep positioning, long-term weight loss and treatment of nasal allergies have been shown to improve snoring.    Exercising tongue muscles with a game (https://www.ncbi.nlm.nih.gov/pubmed/77584950) or stimulating the tongue during the day with a device (https://doi.org/10.3390/izs13518965) have improved snoring in some individuals.    Remember to Drive Safe... Drive Alive     Sleep health profoundly affects your health, mood, and your safety.  Thirty three percent of the population (one in three of us) is not getting enough sleep and many have a sleep disorder. Not getting enough sleep or having an untreated / undertreated sleep condition may make us sleepy without even  knowing it. In fact, our driving could be dramatically impaired due to our sleep health. As your provider, here are some things I would like you to know about driving:     Here are some warning signs for impairment and dangerous drowsy driving:              -Having been awake more than 16 hours               -Looking tired               -Eyelid drooping              -Head nodding (it could be too late at this point)              -Driving for more than 30 minutes     Some things you could do to make the driving safer if you are experiencing some drowsiness:              -Stop driving and rest              -Call for transportation              -Make sure your sleep disorder is adequately treated     Some things that have been shown NOT to work when experiencing drowsiness while driving:              -Turning on the radio              -Opening windows              -Eating any  distracting  /  entertaining  foods (e.g., sunflower seeds, candy, or any other)              -Talking on the phone      Your decision may not only impact your life, but also the life of others. Please, remember to drive safe for yourself and all of us.

## 2022-03-31 NOTE — NURSING NOTE
Sleep study has been scheduled.  Sleep study information has been mailed to pt's home.      DAXA Linares

## 2022-04-13 ENCOUNTER — PRE VISIT (OUTPATIENT)
Dept: UROLOGY | Facility: CLINIC | Age: 42
End: 2022-04-13
Payer: COMMERCIAL

## 2022-05-06 ENCOUNTER — OFFICE VISIT (OUTPATIENT)
Dept: UROLOGY | Facility: CLINIC | Age: 42
End: 2022-05-06
Payer: COMMERCIAL

## 2022-05-06 ENCOUNTER — PRE VISIT (OUTPATIENT)
Dept: UROLOGY | Facility: CLINIC | Age: 42
End: 2022-05-06
Payer: COMMERCIAL

## 2022-05-06 ENCOUNTER — TELEPHONE (OUTPATIENT)
Dept: UROLOGY | Facility: CLINIC | Age: 42
End: 2022-05-06

## 2022-05-06 VITALS
SYSTOLIC BLOOD PRESSURE: 122 MMHG | DIASTOLIC BLOOD PRESSURE: 75 MMHG | HEIGHT: 71 IN | WEIGHT: 195 LBS | BODY MASS INDEX: 27.3 KG/M2 | HEART RATE: 66 BPM

## 2022-05-06 DIAGNOSIS — Z30.09 ENCOUNTER FOR VASECTOMY COUNSELING: Primary | ICD-10-CM

## 2022-05-06 PROCEDURE — 99202 OFFICE O/P NEW SF 15 MIN: CPT | Performed by: UROLOGY

## 2022-05-06 ASSESSMENT — PAIN SCALES - GENERAL: PAINLEVEL: NO PAIN (0)

## 2022-05-06 NOTE — NURSING NOTE
"Chief Complaint   Patient presents with     Consult     Vasectomy       Blood pressure 122/75, pulse 66, height 1.803 m (5' 11\"), weight 88.5 kg (195 lb). Body mass index is 27.2 kg/m .    Patient Active Problem List   Diagnosis     Inguinal hernia     Back pain     Hyperlipidemia LDL goal <160     Right knee pain       No Known Allergies    Current Outpatient Medications   Medication Sig Dispense Refill     Ascorbic Acid (VITAMIN C PO)        Iodine, Kelp, (KELP PO)        magnesium 100 MG CAPS        Misc Natural Products (SUPER GRAPEFRUIT N FIBER DIET PO)        saccharomyces boulardii (FLORASTOR) 250 MG capsule Take 250 mg by mouth 2 times daily (Patient not taking: Reported on 5/6/2022)         Social History     Tobacco Use     Smoking status: Current Some Day Smoker     Types: Pipe     Smokeless tobacco: Never Used   Substance Use Topics     Alcohol use: No     Comment: quit 2010     Drug use: Yes     Types: Marijuana     Comment: marijuana occasionally, hx of cocaine and polysubstance abuse       Alejandra Fermin, SHANNAN  5/6/2022  10:54 AM  "

## 2022-05-06 NOTE — LETTER
"2022       RE: John Valiente  [address] [facility] [address]     Dear Colleague,    Thank you for referring your patient, John Valiente, to the [facility] [place] at [facility]. Please see a copy of my visit note below.    CC: Desires sterilization, consult for vasectomy.  New patient, self referred..    HPI: John Valiente is a 42 year old male with 3 children in his combined family, and he is intersted in getting an open-ended vasectomy for sterilization.     PMH:   Past Medical History:   Diagnosis Date     Alcohol abuse, in remission      No chronic medical problems   Tonsils out as a young man.  History of inguinal hernia repair.  No family history of bleeding or clotting disorders.    FAMILY HX:   Family History   Problem Relation Age of Onset     Alcoholism Mother          from complications      ALLERGIES:    No Known Allergies    MEDS:   No prescription medications at this time.    SOCIAL HISTORY:  Social History     Tobacco Use     Smoking status: Current Some Day Smoker     Types: Pipe     Smokeless tobacco: Never Used   Substance Use Topics     Alcohol use: No     Comment: quit 2010     Drug use: Yes     Types: Marijuana     Comment: marijuana occasionally, hx of cocaine and polysubstance abuse        GENERAL PHYSICAL EXAM:   /75   Pulse 66   Ht 1.803 m (5' 11\")   Wt 88.5 kg (195 lb)   BMI 27.20 kg/m     Physical Exam  /75   Pulse 66   Ht 1.803 m (5' 11\")   Wt 88.5 kg (195 lb)   BMI 27.20 kg/m      General: Alert, oriented, nad.  Pleasant and conversant.  Eyes: anicteric, EOMI.  Pulse: regular  Resps: normal, non-labored.  Abdomen:  Nondistended.  Neurological - no tremors  Skin - no discoloration/ lesions noted   exam:  Phallus normal   Testes ++, anodular, nontender.  Vas and epididymis present and normal bilaterally  Vasa are very easy to find.  REJI not indicated.     I " discussed with him at length the risks and benefits of the procedure. He understands that this is a sterilization procedure, and not reversible contraception. He understands that reversals, while possible, are not guaranteed to work and fairly complex. I discussed with him the option of sperm cryopreservation.     I stressed that he continues to be fertile in the post-operative period, and that he should continue using other contraceptive methods, such as a condom, until he obtains a semen analysis and we review the results to confirm success of the procedure and infertility. I also stressed to him that recanalization and pregnancy can occur in about 1 per two thousand cases, possibly more even after we clear him with a semen analysis showing no motile sperm. I counseled him on the keon-operative risks of bleeding, infection, pain.  I described to him post-vasectomy pain syndrome that can occur in about 1 to 2% of men undergoing vasectomy. This risk should be less with an open-ended vasectomy, which is what he prefers.    We also discussed recovery times (typically days if no complications) and post-operative care including use of ice packs, pain medication and wound care.    He messaged the clinic back and would like to get scheduled for vasectomy procedure in clinic. ( open-ended).      Additional Coding Information:    Problems:  one acute uncomplicated illness or injury    Data Reviewed  Minimal or none    Level of risk:   low risk (e.g., OTC medication or observation, minor surgery without risks)    Time spent:    16 minutes spent on the date of the encounter doing chart review, history and exam, documentation and further activities as noted above.       Lv Saravia MD

## 2022-05-06 NOTE — PROGRESS NOTES
"CC: Desires sterilization, consult for vasectomy.  New patient, self referred..    HPI: John Valiente is a 42 year old male with 3 children in his combined family, and he is intersted in getting an open-ended vasectomy for sterilization.     PMH:   Past Medical History:   Diagnosis Date     Alcohol abuse, in remission      No chronic medical problems   Tonsils out as a young man.  History of inguinal hernia repair.  No family history of bleeding or clotting disorders.    FAMILY HX:   Family History   Problem Relation Age of Onset     Alcoholism Mother          from complications      ALLERGIES:    No Known Allergies    MEDS:   No prescription medications at this time.    SOCIAL HISTORY:  Social History     Tobacco Use     Smoking status: Current Some Day Smoker     Types: Pipe     Smokeless tobacco: Never Used   Substance Use Topics     Alcohol use: No     Comment: quit      Drug use: Yes     Types: Marijuana     Comment: marijuana occasionally, hx of cocaine and polysubstance abuse        GENERAL PHYSICAL EXAM:   /75   Pulse 66   Ht 1.803 m (5' 11\")   Wt 88.5 kg (195 lb)   BMI 27.20 kg/m     Physical Exam  /75   Pulse 66   Ht 1.803 m (5' 11\")   Wt 88.5 kg (195 lb)   BMI 27.20 kg/m      General: Alert, oriented, nad.  Pleasant and conversant.  Eyes: anicteric, EOMI.  Pulse: regular  Resps: normal, non-labored.  Abdomen:  Nondistended.  Neurological - no tremors  Skin - no discoloration/ lesions noted   exam:  Phallus normal   Testes ++, anodular, nontender.  Vas and epididymis present and normal bilaterally  Vasa are very easy to find.  REJI not indicated.     I discussed with him at length the risks and benefits of the procedure. He understands that this is a sterilization procedure, and not reversible contraception. He understands that reversals, while possible, are not guaranteed to work and fairly complex. I discussed with him the option of sperm cryopreservation.     I stressed " that he continues to be fertile in the post-operative period, and that he should continue using other contraceptive methods, such as a condom, until he obtains a semen analysis and we review the results to confirm success of the procedure and infertility. I also stressed to him that recanalization and pregnancy can occur in about 1 per two thousand cases, possibly more even after we clear him with a semen analysis showing no motile sperm. I counseled him on the keon-operative risks of bleeding, infection, pain.  I described to him post-vasectomy pain syndrome that can occur in about 1 to 2% of men undergoing vasectomy. This risk should be less with an open-ended vasectomy, which is what he prefers.    We also discussed recovery times (typically days if no complications) and post-operative care including use of ice packs, pain medication and wound care.    He messaged the clinic back and would like to get scheduled for vasectomy procedure in clinic. ( open-ended).      Additional Coding Information:    Problems:  one acute uncomplicated illness or injury    Data Reviewed  Minimal or none    Level of risk:   low risk (e.g., OTC medication or observation, minor surgery without risks)    Time spent:    16 minutes spent on the date of the encounter doing chart review, history and exam, documentation and further activities as noted above.

## 2022-05-16 NOTE — TELEPHONE ENCOUNTER
"[facility] Call Center    Phone Message    May a detailed message be left on voicemail: yes     Reason for Call: Other: John Abernathy's wife is calling with questions about the appt today 5/6. She states \"it is to complicated and long to tell you I just need to speak to Dr. Saravia for 5 minutes\". Please call her back to discuss, thanks.      Action Taken: Message routed to:  [facility] ([facility] uro    Travel Screening: Not Applicable                                                                        "
Lv Saravia MD  You 3 days ago     CHRIS    I emailed with pt/wife.  He would like to schedule a vasectomy procedure with me under local in the clinic.  Please schedule procedure.     Thank You   Alesia PORTER      Writer will schedule 2:20pm vas on 7/7/2022 for pt if pt calls back ok with date and time. Appt on hold. Writer called and left basic VM to pt to schedule an appt.  
Segundo Izaguirre  You 2 hours ago (11:16 AM)     NOE Hurtado, We encourage all patients to call their insurance Co. Directly to get their coverage questions answered. After that, if there are still questions that Financial Counseling can assist with,  please let me know. Thanks      Writer called and left detailed VM to pt stating message above. Writer provided clinic call back number if pt has additional questions after reaching out to their insurance company directly.   
Writer called and spoke to Michela. Michela stated that she was unable to attend appt and  inquired about a different procedure. Wife is in healthcare and stated the procedure they are considering is an open vasectomy. Michela stated she found Dr Saravia's email and sent an email in regards to this procedure and if Dr Saravia would perform, if not else what does he perform and if anything can he refer pt to another provider that would perform an open vasectomy.   
Writer called to schedule pt for a vas but pt wants it covered by insurance. Writer reaching out to insurance counselors. Writer will get back to pt.   
All documentation in chart is attending documentation. Please see above.

## 2022-05-20 ENCOUNTER — THERAPY VISIT (OUTPATIENT)
Dept: SLEEP MEDICINE | Facility: CLINIC | Age: 42
End: 2022-05-20
Attending: PHYSICIAN ASSISTANT
Payer: COMMERCIAL

## 2022-05-20 DIAGNOSIS — R35.1 NOCTURIA: ICD-10-CM

## 2022-05-20 DIAGNOSIS — R06.83 SNORING: ICD-10-CM

## 2022-05-20 DIAGNOSIS — G47.50 PARASOMNIA, UNSPECIFIED TYPE: ICD-10-CM

## 2022-05-20 PROCEDURE — 95810 POLYSOM 6/> YRS 4/> PARAM: CPT | Performed by: PSYCHIATRY & NEUROLOGY

## 2022-05-25 NOTE — PROCEDURES
" SLEEP STUDY INTERPRETATION  DIAGNOSTIC POLYSOMNOGRAPHY REPORT      Patient: SOCORRO KWON  YOB: 1980  Study Date: 5/20/2022  MRN: [id number]  Referring Provider: -  Ordering Provider: Lona Hernández    Indications for Polysomnography: The patient is a 42 year old Male who is 5' 11\" and weighs 205.0 lbs. His BMI is 28.7, Marshall sleepiness scale 7 and neck circumference is 41CM cm. History of parasomnia behaviors. A diagnostic polysomnogram was performed to evaluate for snoring query EMIGDIO, parasomnia.    Polysomnogram Data: A full night polysomnogram recorded the standard physiologic parameters including EEG, EOG, EMG, ECG, nasal and oral airflow. Respiratory parameters of chest and abdominal movements were recorded with respiratory inductance plethysmography. Oxygen saturation was recorded by pulse oximetry. Hypopnea scoring rule used: 1B 4%.    Sleep Architecture: Sleep fragmentation  The total recording time of the polysomnogram was 499.5 minutes. The total sleep time was 464.0 minutes. Sleep latency was decreased at 1.5 minutes without the use of a sleep aid. REM latency was 123.5 minutes. Arousal index was mildly increased at 23.5 arousals per hour. Sleep efficiency was normal at 92.9%. Wake after sleep onset was 34.0 minutes. The patient spent 4.5% of total sleep time in Stage N1, 48.5% in Stage N2, 19.8% in Stage N3, and 27.2% in REM. Time in REM supine was 57.0 minutes.    Respiration: Patient experienced obstructive apneas and hypopneas primarily while supine, with borderline hypoxemia.    Events ? The polysomnogram revealed a presence of 24 obstructive, 2 central, and - mixed apneas resulting in an apnea index of 3.4 events per hour. There were 20 obstructive hypopneas and - central hypopneas resulting in an obstructive hypopnea index of 2.6 and central hypopnea index of - events per hour. The combined apnea/hypopnea index was 5.9 events per hour (central apnea/hypopnea index was 0.3 " events per hour). The REM AHI was 7.6 events per hour. The supine AHI was 15.0 events per hour. The RERA index was 4.1 events per hour.  The RDI was 10.1 events per hour.    Snoring - was reported as loud and intermittent.    Respiratory rate and pattern - was notable for obstructive hypopneas and apneas, otherwise respiratory rate and pattern.  Sustained Sleep Associated Hypoventilation - Transcutaneous carbon dioxide monitoring was not used.Sleep Associated Hypoxemia - (Greater than 5 minutes O2 sat at or below 88%) was borderline. Baseline oxygen saturation was 92.5%. Lowest oxygen saturation was 82.0%. Time spent less than or equal to 88% was 2.7 minutes. Time spent less than or equal to 89% was 12.8 minutes.    Movement Activity: Patient had normal sleep related motor activity.    Periodic Limb Activity - There were 20 PLMs during the entire study. The PLM index was 2.6 movements per hour. The PLM Arousal Index was 1.4 per hour.    REM EMG Activity - Excessive muscle activity was not present.    Nocturnal Behavior - Abnormal sleep related behaviors were not noted during sleep. No dream enactment.    Bruxism - None apparent.    Cardiac Summary: Limited 1 lead EKG study whose signal was frequently obscured by artifact. Regardless, the study predominantly demonstrated narrow QRS complexes preceded by P waves suggestive of sinus rhythm.    The average pulse rate was 62.7 bpm. The minimum pulse rate was 44.0 bpm while the maximum pulse rate was 92.0 bpm.                      Assessment: Sleep disordered breathing 2/2 obstructive sleep apnea, mild. No clear evidence of REM sleep behavior disorder.    Sleep Architecture: Sleep fragmentation     Respiration: Patient experienced obstructive apneas and hypopneas primarily while supine, with borderline hypoxemia.    Movement Activity: Patient had normal sleep related motor activity.    Cardiac Summary: Limited 1 lead EKG study whose signal was frequently obscured by  artifact. Regardless, the study predominantly demonstrated narrow QRS complexes preceded by P waves suggestive of sinus rhythm.      Recommendations:    Consider treatment of mild obstructive apnea as resolution may frequently address parasomnia behaviors.   o Options include: autoCPAP, oral appliance, upper airway surgery, or lateral positioning.    Advice regarding the risks of drowsy driving.    Suggest optimizing sleep schedule and avoiding sleep deprivation.    Diagnostic Codes:   Obstructive Sleep Apnea G47.33  Parasomnia, Unspecified G47.50    5/20/2022 [facility] Sleep Study (205.0 lbs) - AHI 5.9, RDI 10.1, Supine AHI 15.0, REM AHI 7.6, Low O2 82.0%, Time Spent ?88% 2.7 minutes / Time Spent ?89% 12.8 minutes.    Franck Cuba MD  ITN Fellow    Attending MD: PSG was personally reviewed in detail with the Sleep Medicine Fellow.  The above interpretation reflects our joint assessment and recommendations.         _____________________________________   Electronically Signed By: Ady Campos MD 5-25-22

## 2022-05-27 LAB — SLPCOMP: NORMAL

## 2022-05-31 ENCOUNTER — VIRTUAL VISIT (OUTPATIENT)
Dept: SLEEP MEDICINE | Facility: CLINIC | Age: 42
End: 2022-05-31
Payer: COMMERCIAL

## 2022-05-31 VITALS — WEIGHT: 195 LBS | HEIGHT: 71 IN | BODY MASS INDEX: 27.3 KG/M2

## 2022-05-31 DIAGNOSIS — G47.33 OSA (OBSTRUCTIVE SLEEP APNEA): Primary | ICD-10-CM

## 2022-05-31 PROCEDURE — 99215 OFFICE O/P EST HI 40 MIN: CPT | Mod: 95 | Performed by: PHYSICIAN ASSISTANT

## 2022-05-31 ASSESSMENT — SLEEP AND FATIGUE QUESTIONNAIRES
HOW LIKELY ARE YOU TO NOD OFF OR FALL ASLEEP WHILE SITTING AND READING: WOULD NEVER DOZE
HOW LIKELY ARE YOU TO NOD OFF OR FALL ASLEEP WHILE SITTING INACTIVE IN A PUBLIC PLACE: WOULD NEVER DOZE
HOW LIKELY ARE YOU TO NOD OFF OR FALL ASLEEP WHEN YOU ARE A PASSENGER IN A CAR FOR AN HOUR WITHOUT A BREAK: WOULD NEVER DOZE
HOW LIKELY ARE YOU TO NOD OFF OR FALL ASLEEP WHILE SITTING QUIETLY AFTER LUNCH WITHOUT ALCOHOL: WOULD NEVER DOZE
HOW LIKELY ARE YOU TO NOD OFF OR FALL ASLEEP WHILE SITTING AND TALKING TO SOMEONE: WOULD NEVER DOZE
HOW LIKELY ARE YOU TO NOD OFF OR FALL ASLEEP WHILE WATCHING TV: SLIGHT CHANCE OF DOZING
HOW LIKELY ARE YOU TO NOD OFF OR FALL ASLEEP IN A CAR, WHILE STOPPED FOR A FEW MINUTES IN TRAFFIC: WOULD NEVER DOZE
HOW LIKELY ARE YOU TO NOD OFF OR FALL ASLEEP WHILE LYING DOWN TO REST IN THE AFTERNOON WHEN CIRCUMSTANCES PERMIT: WOULD NEVER DOZE

## 2022-05-31 NOTE — PROGRESS NOTES
John is a 42 year old who is being evaluated via a billable video visit.      How would you like to obtain your AVS? Mail a copy  If the video visit is dropped, the invitation should be resent by: Text to cell phone: [phone]  Will anyone else be joining your video visit? No       Skye Arce, Shaheen Facilitator/LPN    Video-Visit Details    Type of service:  Video Visit    Video Start Time: 10:34AM    Video End Time:11:06AM    Originating Location (pt. Location): Home    Distant Location (provider location):  [facility] SLEEP [facility]     Platform used for Video Visit: [facility] [place]   Outpatient Sleep Medicine  May 31, 2022       Name: John Valiente MRN# [id number]   Age: 42 year old YOB: 1980            Assessment and Plan:   1. EMIGDIO (obstructive sleep apnea)    During this visit, we reviewed the summary of the study including stage, position, event distribution, oximetry and heart rate. Sleep architecture showed all sleep stages present and normal duration, normal sleep efficiency at 93%.  Overall mild obstructive sleep apnea was identified without significant hypoxemia - AHI 5.9, RDI 10.1, Supine AHI 15.0, REM AHI 7.6, Low O2 82.0%, Time Spent ?88% 2.7 minutes / Time Spent ?89% 12.8 minutes. Sleep apnea events were exclusive to supine sleeping position with supine AHI 15 and non-supine AHI -.  Normal sleep related motor activity, no parasomnia identified or evidence of RBD.  No cardiac abnormality.     Discussed potential treatment options for his mild sleep apnea including CPAP, mandibular advancement device, positional restriction and lastly consideration of surgical options. Also discussed option of no treatment as this degree of EMIGDIO not been proved to place patient at risk of increased long term cardiovascular morbidity/mortality.     After discussion of options patient has elected to start with lateral positioning. Discussed devices  and information given in patient instructions. If lateral positioning deemed ineffective will consider MAD or CPAP.     Follow-up for now pending response to lateral positioning device.       Chief Complaint      Chief Complaint   Patient presents with     Video Visit     Return Sleep: PSG Results          History of Present Illness:   John Valiente is a 42 year old male who presents to the clinic for results of recent sleep study completed on 5/20/2022. History of parasomnia behaviors. A diagnostic polysomnogram was performed to evaluate for snoring query EMIGDIO, parasomnia.    Reviewed results of sleep study with patient as follows:   SLEEP STUDY INTERPRETATION  DIAGNOSTIC POLYSOMNOGRAPHY REPORT    Sleep Architecture: Sleep fragmentation  The total recording time of the polysomnogram was 499.5 minutes. The total sleep time was 464.0 minutes. Sleep latency was decreased at 1.5 minutes without the use of a sleep aid. REM latency was 123.5 minutes. Arousal index was mildly increased at 23.5 arousals per hour. Sleep efficiency was normal at 92.9%. Wake after sleep onset was 34.0 minutes. The patient spent 4.5% of total sleep time in Stage N1, 48.5% in Stage N2, 19.8% in Stage N3, and 27.2% in REM. Time in REM supine was 57.0 minutes.     Respiration: Patient experienced obstructive apneas and hypopneas primarily while supine, with borderline hypoxemia.    Events ? The polysomnogram revealed a presence of 24 obstructive, 2 central, and - mixed apneas resulting in an apnea index of 3.4 events per hour. There were 20 obstructive hypopneas and - central hypopneas resulting in an obstructive hypopnea index of 2.6 and central hypopnea index of - events per hour. The combined apnea/hypopnea index was 5.9 events per hour (central apnea/hypopnea index was 0.3 events per hour). The REM AHI was 7.6 events per hour. The supine AHI was 15.0 events per hour. The RERA index was 4.1 events per hour.  The RDI was 10.1 events per  "hour.    Snoring - was reported as loud and intermittent.    Respiratory rate and pattern - was notable for obstructive hypopneas and apneas, otherwise respiratory rate and pattern.    Sustained Sleep Associated Hypoventilation - Transcutaneous carbon dioxide monitoring was not used.    Sleep Associated Hypoxemia - (Greater than 5 minutes O2 sat at or below 88%) was borderline. Baseline oxygen saturation was 92.5%. Lowest oxygen saturation was 82.0%. Time spent less than or equal to 88% was 2.7 minutes. Time spent less than or equal to 89% was 12.8 minutes.     Movement Activity: Patient had normal sleep related motor activity.    Periodic Limb Activity - There were 20 PLMs during the entire study. The PLM index was 2.6 movements per hour. The PLM Arousal Index was 1.4 per hour.    REM EMG Activity - Excessive muscle activity was not present.    Nocturnal Behavior - Abnormal sleep related behaviors were not noted during sleep. No dream enactment.    Bruxism - None apparent.     Cardiac Summary: Limited 1 lead EKG study whose signal was frequently obscured by artifact. Regardless, the study predominantly demonstrated narrow QRS complexes preceded by P waves suggestive of sinus rhythm.    The average pulse rate was 62.7 bpm. The minimum pulse rate was 44.0 bpm while the maximum pulse rate was 92.0 bpm.      Past medical/surgical history, family history, social history, medications and allergies were reviewed.           Physical Examination:   Ht 1.803 m (5' 11\")   Wt 88.5 kg (195 lb)   BMI 27.20 kg/m    General appearance: Awake, alert, cooperative. Well groomed. Sitting comfortably in chair. In no apparent distress.  HEENT: Head: Normocephalic, atraumatic. Eyes:Conjunctiva clear. Sclera normal. Nose: External appearance without deformity.   Pulmonary:  Able to speak easily in full sentences. No cough or wheeze.   Skin:  No rashes or significant lesions on visible skin.   Neurologic: Alert, oriented x3. "   Psychiatric: Mood euthymic. Affect congruent with full range and intensity.      CC:  Ady Scott PA-C  May 31, 2022     [facility] [place]  [address] [place] [address]     [facility] [place]  [address] [address]    Chart documentation was completed, in part, with [facility] voice-recognition software. Even though reviewed, some grammatical, spelling, and word errors may remain.      43 minutes spent on day of encounter doing chart review, history and exam, documentation, and further activities as noted above

## 2022-05-31 NOTE — NURSING NOTE
Chief Complaint   Patient presents with     Video Visit     Return Sleep: PSG Results     Skye Arce, Virtual Facilitator/LPN

## 2022-05-31 NOTE — PATIENT INSTRUCTIONS
Positioning Device  Positioning devices are generally used when sleep apnea is mild and only occurs on your back.This example shows a pillow that straps around the waist. It may be appropriate for those whose sleep study shows milder sleep apnea that occurs primarily when lying flat on one's back. Preliminary studies have shown benefit but effectiveness at home may need to be verified by a home sleep test. These devices are generally not covered by medical insurance.  Examples of devices that maintain sleeping on the back to prevent snoring and mild sleep apnea.    Belt type body positioner  Zzoma pillow  -  [email or web address] [email or web address] Health and Human Services.[email or web address]/  Sleep Noodle  Slumber Bump     Electronic reminder  http://[email or web address]  [email or web address]

## 2022-07-15 PROBLEM — M25.561 RIGHT KNEE PAIN: Status: RESOLVED | Noted: 2021-06-07 | Resolved: 2022-07-15

## 2024-07-06 ENCOUNTER — HEALTH MAINTENANCE LETTER (OUTPATIENT)
Age: 44
End: 2024-07-06

## 2024-07-08 ENCOUNTER — MEDICAL CORRESPONDENCE (OUTPATIENT)
Dept: HEALTH INFORMATION MANAGEMENT | Facility: CLINIC | Age: 44
End: 2024-07-08

## 2024-07-12 ENCOUNTER — TRANSCRIBE ORDERS (OUTPATIENT)
Dept: OTHER | Age: 44
End: 2024-07-12

## 2024-07-12 DIAGNOSIS — M25.562 CHRONIC PAIN OF BOTH KNEES: ICD-10-CM

## 2024-07-12 DIAGNOSIS — M25.521 BILATERAL ELBOW JOINT PAIN: Primary | ICD-10-CM

## 2024-07-12 DIAGNOSIS — M25.561 CHRONIC PAIN OF BOTH KNEES: ICD-10-CM

## 2024-07-12 DIAGNOSIS — G89.29 CHRONIC PAIN OF BOTH KNEES: ICD-10-CM

## 2024-07-12 DIAGNOSIS — M25.522 BILATERAL ELBOW JOINT PAIN: Primary | ICD-10-CM

## 2024-08-12 ENCOUNTER — THERAPY VISIT (OUTPATIENT)
Dept: PHYSICAL THERAPY | Facility: CLINIC | Age: 44
End: 2024-08-12
Attending: FAMILY MEDICINE

## 2024-08-12 DIAGNOSIS — M77.12 BILATERAL TENNIS ELBOW: Primary | ICD-10-CM

## 2024-08-12 DIAGNOSIS — M77.11 BILATERAL TENNIS ELBOW: Primary | ICD-10-CM

## 2024-08-12 PROCEDURE — 97110 THERAPEUTIC EXERCISES: CPT | Mod: GP | Performed by: PHYSICAL THERAPIST

## 2024-08-12 PROCEDURE — 97161 PT EVAL LOW COMPLEX 20 MIN: CPT | Mod: GP | Performed by: PHYSICAL THERAPIST

## 2024-08-12 ASSESSMENT — ACTIVITIES OF DAILY LIVING (ADL)
ADL_COUNT: 1
ADL_HIGHEST_POTENTIAL_SCORE: 4
ANY_OF_YOUR_USUAL_WORK,_HOUSEWORK_OR_SCHOOL_ACTIVITIES: MODERATE DIFFICULTY
PREPARING_FOOD: NO DIFFICULTY
UEFI_TOTAL_SCORE: 67
OPENING_A_JAR: NO DIFFICULTY
SPORTS_TOTAL_ITEM_SCORE: 0
LAUNDERING_CLOTHES: NO DIFFICULTY
HOW_WOULD_YOU_RATE_YOUR_CURRENT_LEVEL_OF_FUNCTION?: ABNORMAL
YOUR_USUAL_HOBBIES,_RECREATIONAL_OR_SPORTING_ACTIVITIES: QUITE A BIT OF DIFFICULTY
HOW_WOULD_YOU_RATE_YOUR_CURRENT_LEVEL_OF_FUNCTION_DURING_YOUR_SPORTS_RELATED_ACTIVITIES_FROM_0_TO_100_WITH_100_BEING_YOUR_LEVEL_OF_FUNCTION_PRIOR_TO_YOUR_HIP_PROBLEM_AND_0_BEING_THE_INABILITY_TO_PERFORM_ANY_OF_YOUR_USUAL_DAILY_ACTIVITIES?: 90
TYING_OR_LACING_SHOES: NO DIFFICULTY
DOING_UP_BUTTONS: NO DIFFICULTY
UEFI_TOTAL_SCORE/80: .84
ADL_SCORE(%): INCOMPLETE
SPORTS_SCORE(%): 0
HOW_WOULD_YOU_RATE_YOUR_CURRENT_LEVEL_OF_FUNCTION_DURING_YOUR_USUAL_ACTIVITIES_OF_DAILY_LIVING_FROM_0_TO_100_WITH_100_BEING_YOUR_LEVEL_OF_FUNCTION_PRIOR_TO_YOUR_HIP_PROBLEM_AND_0_BEING_THE_INABILITY_TO_PERFORM_ANY_OF_YOUR_USUAL_DAILY_ACTIVITIES?: 90
ADL_TOTAL_ITEM_SCORE: INCOMPLETE
SPORTS_HIGHEST_POTENTIAL_SCORE: 36
JUMPING: SLIGHT DIFFICULTY
WASHING_YOUR_HAIR_OR_SCALP: NO DIFFICULTY
HOS_ADL_ITEM_SCORE_TOTAL: 0
HOS_ADL_HIGHEST_POTENTIAL_SCORE: 0
HOW_WOULD_YOU_RATE_YOUR_CURRENT_LEVEL_OF_FUNCTION_DURING_YOUR_USUAL_ACTIVITIES_OF_DAILY_LIVING_FROM_0_TO_100_WITH_100_BEING_YOUR_LEVEL_OF_FUNCTION_PRIOR_TO_YOUR_HIP_PROBLEM_AND_0_BEING_THE_INABILITY_TO_PERFORM_ANY_OF_YOUR_USUAL_DAILY_ACTIVITIES?: 90
USING_TOOLS_OR_APPLIANCES: A LITTLE BIT OF DIFFICULTY
PUSHING_UP_ON_YOUR_HANDS: A LITTLE BIT OF DIFFICULTY
SPORTS_COUNT: 9
VACUUMING,_SWEEPING,_OR_RAKING: A LITTLE BIT OF DIFFICULTY
DRIVING: NO DIFFICULTY
SLEEPING: NO DIFFICULTY
OPENING_DOORS: NO DIFFICULTY
ABILITY_TO_PARTICIPATE_IN_YOUR_DESIRED_SPORT_AS_LONG_AS_YOU_WOULD_LIKE: EXTREME DIFFICULTY
RUNNING_ONE_MILE: MODERATE DIFFICULTY
PLEASE_INDICATE_YOR_PRIMARY_REASON_FOR_REFERRAL_TO_THERAPY:: ELBOW
THROWING_A_BALL: A LITTLE BIT OF DIFFICULTY
CLEANING: A LITTLE BIT OF DIFFICULTY
PLACING_AN_OBJECT_ONTO,_OR_REMOVING_IT_FROM_AN_OVERHEAD_SHELF: NO DIFFICULTY
PLEASE_INDICATE_YOR_PRIMARY_REASON_FOR_REFERRAL_TO_THERAPY:: HIP
LIFTING_A_BAG_OF_GROCERIES_TO_WAIST_LEVEL: NO DIFFICULTY
DRESS: NO DIFFICULTY
CARRYING_A_SMALL_SUITCASE_WITH_YOUR_AFFECTED_LIMB: A LITTLE BIT OF DIFFICULTY

## 2024-08-12 NOTE — PROGRESS NOTES
PHYSICAL THERAPY EVALUATION  Type of Visit: Evaluation              Subjective   Pt reports +3 months of worsening medial elbow pain. He is also asking for input into his return to heavier workouts/stretching.      Presenting condition or subjective complaint: tennis elbow  Date of onset: 25    Relevant medical history:     Dates & types of surgery:      Prior diagnostic imaging/testing results:       Prior therapy history for the same diagnosis, illness or injury: No      Prior Level of Function  Transfers: Independent  Ambulation: Independent  ADL: Independent  IADL: Childcare, Driving, Finances, Housekeeping, Laundry, Meal preparation, Medication management, Work    Living Environment  Social support: With family members   Type of home: House   Stairs to enter the home:         Ramp: No   Stairs inside the home: Yes       Help at home: None  Equipment owned:       Employment: Yes self contractor  Hobbies/Interests:      Patient goals for therapy: weights,pushups  chinups,chest press    Pain assessment: Location: medial epic/elbows bilat /Ratin/10      Objective   PAIN: Pain is Exacerbated By: lifting, computer, hand tools   INTEGUMENTARY (edema, incisions): WNL  POSTURE: WNL  ROM: AROM WFL, wrist tightness present minimal bilat     STRENGTH: 90# Rt , 104 left#    FLEXIBILITY:   SPECIAL TESTS: negative prov   PALPATION: +TTP bilat medial epic, neg lateral   JOINT MOBILITY: WNL  CERVICAL SCREEN: WNL  THORACIC SCREEN:   SHOULDER SCREEN:     Assessment & Plan   CLINICAL IMPRESSIONS  Medical Diagnosis: UE/elbow pain bilat    Treatment Diagnosis: elbow pain bilat   Impression/Assessment: Patient is a 44 year old male with bilat elbow complaints.  The following significant findings have been identified: Pain, Decreased ROM/flexibility, Inflammation, Impaired muscle performance, and Decreased activity tolerance. These impairments interfere with their ability to perform self care tasks, work tasks, and  recreational activities as compared to previous level of function.   Pt presents w/ UE pain/elbow TTP. He will benefit from a course of PT to improve his functional level.     Clinical Decision Making (Complexity):  Clinical Presentation: Stable/Uncomplicated  Clinical Presentation Rationale: based on medical and personal factors listed in PT evaluation  Clinical Decision Making (Complexity): Low complexity    PLAN OF CARE  Treatment Interventions:manual therapy, theraputic exercises,  Modalities: Cryotherapy    Long Term Goals     PT Goal 1  Goal Identifier: UE use  Goal Description: drill/hammer/cmputer w/o pain  Rationale: to maximize safety and independence with performance of ADLs and functional tasks;to maximize safety and independence within the home;to maximize safety and independence with self cares  Target Date: 11/04/24      Frequency of Treatment: q 2 weeks  Duration of Treatment: 12 weeks    Recommended Referrals to Other Professionals: Physical Therapy  Education Assessment:        Risks and benefits of evaluation/treatment have been explained.   Patient/Family/caregiver agrees with Plan of Care.     Evaluation Time:     PT Eval, Low Complexity Minutes (82570): 25       Signing Clinician: Efrain Brooks, PT      [facility]                                                                                   OUTPATIENT PHYSICAL THERAPY    PLAN OF TREATMENT FOR OUTPATIENT REHABILITATION   Patient's Last Name, First Name, John Wallace YOB: 1980   Provider's Name   [facility]   Medical Record No.  [id number]     Onset Date: 07/08/24  Start of Care Date: 08/12/24     Medical Diagnosis:  UE/elbow pain bilat      PT Treatment Diagnosis:  elbow pain bilat Plan of Treatment  Frequency/Duration: q 2 weeks/ 12 weeks    Certification date from 08/12/24 to 11/04/24         See note for plan of treatment details and functional  goals     Efrain Brooks, PT                         I CERTIFY THE NEED FOR THESE SERVICES FURNISHED UNDER        THIS PLAN OF TREATMENT AND WHILE UNDER MY CARE .             Physician Signature               Date    X_____________________________________________________                  Referring Provider:  Jan Luna    Initial Assessment  See Epic Evaluation- Start of Care Date: 08/12/24

## 2024-11-13 PROBLEM — M77.11 BILATERAL TENNIS ELBOW: Status: RESOLVED | Noted: 2024-08-12 | Resolved: 2024-11-13

## 2024-11-13 PROBLEM — M77.12 BILATERAL TENNIS ELBOW: Status: RESOLVED | Noted: 2024-08-12 | Resolved: 2024-11-13

## 2025-07-13 ENCOUNTER — HEALTH MAINTENANCE LETTER (OUTPATIENT)
Age: 45
End: 2025-07-13